# Patient Record
Sex: MALE | Race: WHITE | Employment: FULL TIME | URBAN - METROPOLITAN AREA
[De-identification: names, ages, dates, MRNs, and addresses within clinical notes are randomized per-mention and may not be internally consistent; named-entity substitution may affect disease eponyms.]

---

## 2019-10-07 NOTE — H&P
[address] [facility] [address] Gastroenterology                                                                                                  Clinic History and Physical     Pa 2-3 ETOH/wk  + cannabis   - Occupation: Business owner  - Lives with spouse  - NSAIDs/ASA use: PRN      History, Medications, Allergies, ROS:      Past Medical History:   Diagnosis Date   • Hyperlipidemia       History reviewed.  No pertinent surgical histo no abnormal bowel sounds noted, no masses are palpated  : no CVA tenderness  Skin: dry, warm, no jaundice  Ext: no cyanosis, clubbing or edema is evident.    Neuro: Alert and oriented x4, and patient is having movements of all 4 extremities   Psych: + Anx diagnosis), benefits, and alternatives to upper endoscopy/enteroscopy with the patient [who demonstrated understanding], including but not limited to the risks of bleeding, infection, pain, as well as the risks of anesthesia and perforation all leading to

## 2019-10-14 ENCOUNTER — OFFICE VISIT (OUTPATIENT)
Dept: GASTROENTEROLOGY | Facility: CLINIC | Age: 49
End: 2019-10-14
Payer: COMMERCIAL

## 2019-10-14 ENCOUNTER — TELEPHONE (OUTPATIENT)
Dept: GASTROENTEROLOGY | Facility: CLINIC | Age: 49
End: 2019-10-14

## 2019-10-14 VITALS
DIASTOLIC BLOOD PRESSURE: 78 MMHG | HEIGHT: 67 IN | RESPIRATION RATE: 18 BRPM | SYSTOLIC BLOOD PRESSURE: 126 MMHG | BODY MASS INDEX: 32.4 KG/M2 | HEART RATE: 67 BPM | OXYGEN SATURATION: 97 % | WEIGHT: 206.44 LBS

## 2019-10-14 DIAGNOSIS — K21.9 GASTROESOPHAGEAL REFLUX DISEASE, ESOPHAGITIS PRESENCE NOT SPECIFIED: Primary | ICD-10-CM

## 2019-10-14 DIAGNOSIS — R07.89 NON-CARDIAC CHEST PAIN: ICD-10-CM

## 2019-10-14 DIAGNOSIS — R13.10 DYSPHAGIA, UNSPECIFIED TYPE: ICD-10-CM

## 2019-10-14 PROCEDURE — 99243 OFF/OP CNSLTJ NEW/EST LOW 30: CPT | Performed by: NURSE PRACTITIONER

## 2019-10-14 RX ORDER — ESOMEPRAZOLE MAGNESIUM 40 MG/1
40 CAPSULE, DELAYED RELEASE ORAL
COMMUNITY
Start: 2019-09-23 | End: 2021-01-05

## 2019-10-14 NOTE — PATIENT INSTRUCTIONS
Recommend:  -Schedule EGD w/ possible biopsy and possible dilatation w/earlier available MD w/ MAC  Diagnosis: Noncardiac chest pain, GERD, dysphagia  -Eligible for NE: No r/t cannabis use   -Anti-platelets and anti-coagulants: None  -Diabetes meds: None

## 2019-10-14 NOTE — TELEPHONE ENCOUNTER
Contacted [facility] and spoke with Nisreen Cortes. CPT, ICD codes provided, determined that case needs clinical information. Instructed to fax clinical information to fax #: [phone] w/ pending reference #[id number].   Office notes faxed, fax confirmation rece

## 2019-10-14 NOTE — TELEPHONE ENCOUNTER
Scheduled for:  EGD w/poss Dil and biopsy 14841  Provider Name: Dr. Shi Greernackellie  Date:  10/24/19  Location:  [facility]  Sedation:  MAC  Time:   6495 (pt is aware to arrive at [address] )   Prep:  NPO after midnight  Meds/Allergies Reconciled?:  Physician reviewed   Musa Fontenot

## 2019-10-14 NOTE — TELEPHONE ENCOUNTER
GI/RN--    This patient is scheduled, see below    I sent a referral to [facility], [facility]. I did confirm with Ryan Mullen that [facility] does need a PA.  Thank you    You may close this TE w

## 2019-10-16 NOTE — TELEPHONE ENCOUNTER
Contacted [facility] and was transferred to [address], spoke with Denise Ordoñez. He confirmed that clinical information received and been attached to patient's case and still currently pending for review.   Per [facility] he will put a notation that

## 2019-10-21 NOTE — TELEPHONE ENCOUNTER
[address] again to obtain status. Spoke to Marcy and stated it's still pending but there's already a nurse assign to the case for review (Mickael Schwab.). I asked what the turn around time is but unable to give specific time. Will follow up tomorrow.

## 2019-10-22 ENCOUNTER — TELEPHONE (OUTPATIENT)
Dept: GASTROENTEROLOGY | Facility: CLINIC | Age: 49
End: 2019-10-22

## 2019-10-22 NOTE — TELEPHONE ENCOUNTER
Dr. Aissatou White,     Contacted patient, got verbal auth from patient to speak with wife. Pt scheduled for an EGD 10/24/19 Thursday for dysphagia.   Wife states pt felt sick Sunday night and now developed sore throat with odynophagia, non-productive cough (pt smo

## 2019-10-22 NOTE — TELEPHONE ENCOUNTER
Contacted [employer] to get status. Per Ivonne Landon from [address] [place] [address] is approved with start date of 10/24/19-11/22/19 auth number stays the same as the reference number [id number].       I asked Ivonne Landon to have the auth faxed to us but states only Clinical Intake Dept te

## 2019-10-22 NOTE — TELEPHONE ENCOUNTER
Wife contacted and relayed below message from Dr. Diomedes Singh. Wife instructed to contact us today after visit with PCP. Wife voiced understanding.

## 2019-10-22 NOTE — TELEPHONE ENCOUNTER
Pt has EGD scheduled for 10/24/19 and per wife pt is sick. Wife states pt has sore throat and coughing. Wife inquiring if pt can proceed with EGD. Wife states pt will see PCP today regarding symptoms.  Please call [phone]

## 2019-10-23 NOTE — TELEPHONE ENCOUNTER
Dr. Aissatou White,     Wife did not call yesterday so I contacted them today. Per wife PCP diagnosed pt with bronchitis, CXR negative for pneumonia, was put on zpack and was told ok to proceed with EGD.   Per wife pt is back to work today and feeling \"fine\" not

## 2019-10-23 NOTE — TELEPHONE ENCOUNTER
Hard copy of approval from [facility] received. Hard copy sent for scanning.

## 2019-10-23 NOTE — TELEPHONE ENCOUNTER
I spoke with Hardy Esqueda. @ [facility]. ELANA w/dil approved for [facility] outpatient surgery. DOS 10/24/19 through 11/22/19. Authorization # [id number]. Referral is already updared per PATIENCE HERNANDZE.

## 2019-10-24 ENCOUNTER — ANESTHESIA (OUTPATIENT)
Dept: ENDOSCOPY | Facility: HOSPITAL | Age: 49
End: 2019-10-24
Payer: COMMERCIAL

## 2019-10-24 ENCOUNTER — HOSPITAL ENCOUNTER (OUTPATIENT)
Facility: HOSPITAL | Age: 49
Setting detail: HOSPITAL OUTPATIENT SURGERY
Discharge: HOME | End: 2019-10-24
Attending: INTERNAL MEDICINE | Admitting: INTERNAL MEDICINE
Payer: COMMERCIAL

## 2019-10-24 ENCOUNTER — ANESTHESIA EVENT (OUTPATIENT)
Dept: ENDOSCOPY | Facility: HOSPITAL | Age: 49
End: 2019-10-24
Payer: COMMERCIAL

## 2019-10-24 DIAGNOSIS — R13.10 DYSPHAGIA, UNSPECIFIED TYPE: ICD-10-CM

## 2019-10-24 DIAGNOSIS — R07.89 NON-CARDIAC CHEST PAIN: ICD-10-CM

## 2019-10-24 DIAGNOSIS — K21.9 GASTROESOPHAGEAL REFLUX DISEASE, ESOPHAGITIS PRESENCE NOT SPECIFIED: ICD-10-CM

## 2019-10-24 PROCEDURE — 43239 EGD BIOPSY SINGLE/MULTIPLE: CPT | Performed by: INTERNAL MEDICINE

## 2019-10-24 PROCEDURE — 0DB38ZX EXCISION OF LOWER ESOPHAGUS, VIA NATURAL OR ARTIFICIAL OPENING ENDOSCOPIC, DIAGNOSTIC: ICD-10-PCS | Performed by: INTERNAL MEDICINE

## 2019-10-24 PROCEDURE — 0DB28ZX EXCISION OF MIDDLE ESOPHAGUS, VIA NATURAL OR ARTIFICIAL OPENING ENDOSCOPIC, DIAGNOSTIC: ICD-10-PCS | Performed by: INTERNAL MEDICINE

## 2019-10-24 RX ORDER — SODIUM CHLORIDE, SODIUM LACTATE, POTASSIUM CHLORIDE, CALCIUM CHLORIDE 600; 310; 30; 20 MG/100ML; MG/100ML; MG/100ML; MG/100ML
INJECTION, SOLUTION INTRAVENOUS CONTINUOUS
Status: DISCONTINUED | OUTPATIENT
Start: 2019-10-24 | End: 2019-10-24

## 2019-10-24 RX ADMIN — SODIUM CHLORIDE, SODIUM LACTATE, POTASSIUM CHLORIDE, CALCIUM CHLORIDE: 600; 310; 30; 20 INJECTION, SOLUTION INTRAVENOUS at 09:51:00

## 2019-10-24 NOTE — ANESTHESIA PREPROCEDURE EVALUATION
Anesthesia PreOp Note    HPI:     Heather Price is a 44year old male who presents for preoperative consultation requested by: Keagan Barron MD    Date of Surgery: 10/24/2019    Procedure(s):  ESOPHAGOGASTRODUODENOSCOPY (EGD)  Indication: Marlene Mi Types: Cannabis        Comment: OCC      Sexual activity: Not on file    Lifestyle      Physical activity:        Days per week: Not on file        Minutes per session: Not on file      Stress: Not on file    Relationships      Social connections: patient's questions were answered to the best of my ability. The patient desires the anesthetic management as planned.   Willy Story  10/24/2019 9:19 AM

## 2019-10-24 NOTE — ANESTHESIA POSTPROCEDURE EVALUATION
Patient: Maury Yañez    Procedure Summary     Date:  10/24/19 Room / Location:  [address] [place] [address] ENDOSCOPY 04 / [address] [place] [address] ENDOSCOPY    Anesthesia Start:  7605 Anesthesia Stop:  1010    Procedure:  ESOPHAGOGASTRODUODENOSCOPY (EGD) (N/A ) Diagnosis:       Gastroesophageal re

## 2019-10-24 NOTE — OPERATIVE REPORT
[facility] Endoscopy Report      Preoperative Diagnosis:  - GERD   - Atypical chest pain      Postoperative Diagnosis:  - hiatal hernia 2 cm  - reflux esophagitis      Procedure:    Esophagogastroduodenoscopy       Surgeon:  Evelyn Tapia

## 2019-10-24 NOTE — H&P
History & Physical Examination    Patient Name: Shahnaz Benjamin  MRN: [id number]  [facility]: [id number]  YOB: 1970    Diagnosis:   Gastroesophageal reflux disease, esophagitis presence not specified, Non-cardiac chest pain, Dysphagia, unspecified typ

## 2019-10-25 VITALS
DIASTOLIC BLOOD PRESSURE: 92 MMHG | BODY MASS INDEX: 29.35 KG/M2 | RESPIRATION RATE: 12 BRPM | OXYGEN SATURATION: 100 % | SYSTOLIC BLOOD PRESSURE: 128 MMHG | HEART RATE: 57 BPM | WEIGHT: 205 LBS | HEIGHT: 70 IN

## 2019-11-15 ENCOUNTER — TELEPHONE (OUTPATIENT)
Dept: GASTROENTEROLOGY | Facility: CLINIC | Age: 49
End: 2019-11-15

## 2019-11-15 DIAGNOSIS — R13.10 DYSPHAGIA, UNSPECIFIED TYPE: Primary | ICD-10-CM

## 2019-11-15 NOTE — TELEPHONE ENCOUNTER
----- Message from Alan León MD sent at 11/14/2019  6:26 PM CST -----  EGD showed a small hiatal hernia but no stricture/narrowing. RN to see how the pt is doing on the PPI therapy.    Would advise esophageal motility if ongoing issues despite acid

## 2019-11-15 NOTE — TELEPHONE ENCOUNTER
Spoke with pt and informed test results. States he has cut out coffee and alcohol and has not had any heartburn since, he feels the Nexium is helping. He also is open to having the esophageal motility test done if Dr Celia Sterling felt necessary.

## 2019-11-15 NOTE — TELEPHONE ENCOUNTER
Lets see how he does over the next 4 - 6 weeks. If ongoing issues then we can discuss esophageal motility study.

## 2019-11-18 NOTE — TELEPHONE ENCOUNTER
Dr. Ivonne Montesinos,     Pt is interested in getting the esophageal motility study rather than waiting 4-6 weeks since he's still having discomfort when he swallows, states \"has weird feeling, it's not normal, something is going on\".     States he's not having karlo

## 2019-11-19 NOTE — TELEPHONE ENCOUNTER
Pt was contacted and notified him about Dr. Prachi Villa agreeing to proceed with esophageal motility test. Was advised that he will get a call from GI schedulers to set this up. GI schedulers - please contact patient to schedule esophageal motility.  DX: dysph

## 2019-11-20 NOTE — TELEPHONE ENCOUNTER
Scheduled for:  Esophageal Manometry 17648  Provider Name: Dr. Denton Pathak  Date:  12/5/19  Location:  [facility]  Sedation:  None  Time:   0730 (pt is aware to arrive at 0630)   Prep:  No smoking, eating or drinking after 1845 the evening prior to the procedure  Meds/

## 2019-12-03 ENCOUNTER — TELEPHONE (OUTPATIENT)
Dept: GASTROENTEROLOGY | Facility: CLINIC | Age: 49
End: 2019-12-03

## 2019-12-03 NOTE — TELEPHONE ENCOUNTER
Pt's wife contacted and notified her that Dr. Miguel Hayes stated it's ok to continue with esomeprazole and still get the test done. Wife voiced understanding and denied further questions.

## 2019-12-03 NOTE — TELEPHONE ENCOUNTER
Dr. Gary Bills,     Pt is scheduled to have esophageal manometry done on 12/05/19 Thursday. Pt was instructed to hold PPI 7 days prior to the procedure but has not done so. Please advise if he can still proceed with the test.     Thank you.

## 2019-12-03 NOTE — TELEPHONE ENCOUNTER
Pts wife called with questions about pts medication and 12/5/19 procedure. She also states that she spoke to insurance and was told this will need prior auth,   Please call. [facility] phone # is [phone].

## 2019-12-03 NOTE — TELEPHONE ENCOUNTER
Referral on file states No PA is required for the procedure. Contacted [employer] and spoke with Xiomara Bill from the Intake (Prior Auth Dept) Dept Team, gave him the CPT code of 93482, [address] [facility] [place], Dr. Corbin HARGROVE and upon checking the information.   States this proce

## 2019-12-05 ENCOUNTER — HOSPITAL ENCOUNTER (OUTPATIENT)
Facility: HOSPITAL | Age: 49
Setting detail: HOSPITAL OUTPATIENT SURGERY
Discharge: HOME | End: 2019-12-05
Attending: INTERNAL MEDICINE | Admitting: INTERNAL MEDICINE
Payer: COMMERCIAL

## 2019-12-05 VITALS
OXYGEN SATURATION: 96 % | DIASTOLIC BLOOD PRESSURE: 80 MMHG | WEIGHT: 202 LBS | RESPIRATION RATE: 14 BRPM | HEART RATE: 57 BPM | SYSTOLIC BLOOD PRESSURE: 120 MMHG | HEIGHT: 67 IN | BODY MASS INDEX: 31.71 KG/M2

## 2019-12-05 DIAGNOSIS — R13.10 DYSPHAGIA, UNSPECIFIED TYPE: ICD-10-CM

## 2019-12-05 PROCEDURE — 4A0B78Z MEASUREMENT OF GASTROINTESTINAL MOTILITY, VIA NATURAL OR ARTIFICIAL OPENING: ICD-10-PCS | Performed by: INTERNAL MEDICINE

## 2019-12-05 RX ORDER — LIDOCAINE HYDROCHLORIDE 20 MG/ML
SOLUTION OROPHARYNGEAL
Status: DISCONTINUED | OUTPATIENT
Start: 2019-12-05 | End: 2019-12-05

## 2019-12-05 NOTE — OR NURSING
Esophageal manometry catheter placed at 50.5 cm to left nare numbed with lidocaine  Series of swallows tolerated well. Reviewed post op care.  No distress

## 2019-12-09 NOTE — H&P
History & Physical Examination    Patient Name: Zan Joseph  MRN: [id number]  CSN: [id number]  YOB: 1970    Diagnosis: gerd      Esomeprazole Magnesium 40 MG Oral Capsule Delayed Release, Take 40 mg by mouth every morning before breakfast.,

## 2019-12-17 ENCOUNTER — OFFICE VISIT (OUTPATIENT)
Dept: GASTROENTEROLOGY | Facility: CLINIC | Age: 49
End: 2019-12-17
Payer: COMMERCIAL

## 2019-12-17 ENCOUNTER — TELEPHONE (OUTPATIENT)
Dept: GASTROENTEROLOGY | Facility: CLINIC | Age: 49
End: 2019-12-17

## 2019-12-17 VITALS
DIASTOLIC BLOOD PRESSURE: 83 MMHG | HEIGHT: 67 IN | SYSTOLIC BLOOD PRESSURE: 128 MMHG | WEIGHT: 209 LBS | BODY MASS INDEX: 32.8 KG/M2

## 2019-12-17 DIAGNOSIS — R07.9 CHEST PAIN, UNSPECIFIED TYPE: Primary | ICD-10-CM

## 2019-12-17 PROCEDURE — 99213 OFFICE O/P EST LOW 20 MIN: CPT | Performed by: INTERNAL MEDICINE

## 2019-12-17 RX ORDER — AZITHROMYCIN 250 MG/1
TABLET, FILM COATED ORAL
Refills: 0 | COMMUNITY
Start: 2019-10-22 | End: 2021-01-05

## 2019-12-17 NOTE — TELEPHONE ENCOUNTER
Signed TEE form faxed to pt's PCP, Dr. Gaurang Garcia, for recent labwork. Also for last colonoscopy report, if his office has it. If not, will sent to [facility].     Dr. Gaurang Garcia fax: [phone]

## 2019-12-17 NOTE — PATIENT INSTRUCTIONS
Esophageal/GERD symptoms  - CT chest and abdomen  - avoid alcohol and coffee  - lab work from primary doctor  - stop smoking

## 2019-12-17 NOTE — OPERATIVE REPORT
[facility] High-resolution Esophageal Motility report      Preoperative Diagnosis:  - GERD  - dysphagia      Postoperative Diagnosis:  - normal study      Procedure:    High-resolution esophageal motility study      Surgeon:  Lisbeth Morrow,

## 2019-12-17 NOTE — PROGRESS NOTES
Nay Leslie is a 44year old male. HPI:   Patient presents with:  Hospital F/U    The patient is a 70-year-old male who has a history of atypical chest pain.   He reports this typically occurs with reflux type symptoms but is been unresponsive to acid UNKNOWN      ROS:   The patient denies any chest pain or shortness of breath,  No neurologic or dermatologic symptoms. PHYSICAL EXAM:   Blood pressure 128/83, height 5' 7\" (1.702 m), weight 209 lb (94.8 kg).     The patient appears their stated age a

## 2019-12-27 ENCOUNTER — HOSPITAL ENCOUNTER (OUTPATIENT)
Dept: CT IMAGING | Facility: HOSPITAL | Age: 49
Discharge: HOME | End: 2019-12-27
Attending: INTERNAL MEDICINE
Payer: COMMERCIAL

## 2019-12-27 ENCOUNTER — PATIENT MESSAGE (OUTPATIENT)
Dept: GASTROENTEROLOGY | Facility: CLINIC | Age: 49
End: 2019-12-27

## 2019-12-27 DIAGNOSIS — R07.9 CHEST PAIN, UNSPECIFIED TYPE: ICD-10-CM

## 2019-12-27 PROCEDURE — 71260 CT THORAX DX C+: CPT | Performed by: INTERNAL MEDICINE

## 2019-12-27 PROCEDURE — 82565 ASSAY OF CREATININE: CPT

## 2019-12-27 PROCEDURE — 74160 CT ABDOMEN W/CONTRAST: CPT | Performed by: INTERNAL MEDICINE

## 2019-12-27 NOTE — TELEPHONE ENCOUNTER
Dr. Marlen Villar,     Please see below message from the patient. Pt was contacted and confirmed that he has not had any chest pain or symptoms r/t to his problems with swallowing so far. He's also not taking PPI.   He has not made an appointment with Dr. Martha Oconnell

## 2019-12-27 NOTE — TELEPHONE ENCOUNTER
From: Rachel Pope  To: Chuckie Green. Francy Hussein MD  Sent: 12/27/2019 2:28 PM CST  Subject: Other    Thanks for the news so soon. Please let me know when we can talk or visit if necessary.   No episodes thus far as I am very careful choosing the consumption of m

## 2019-12-28 NOTE — TELEPHONE ENCOUNTER
msg left on vm, I was able to review his motility study with an esophageal expert who did not come up with any other findings. CT scan negative.     Would have the patient follow back up in the office but I would have him follow back up with his primary ph

## 2020-01-02 NOTE — TELEPHONE ENCOUNTER
Dr Diomedes Singh, I reviewed your message below. He will f/u with Dr Gaurang Garcia. States he thinks you wanted labs ordered prior to his f/u with you--he accepted appt with you Tues Feb 20, arrival 3:45pm and given directions to [facility]. Please advise on labs. Thanks.

## 2020-02-22 ENCOUNTER — PATIENT MESSAGE (OUTPATIENT)
Dept: GASTROENTEROLOGY | Facility: CLINIC | Age: 50
End: 2020-02-22

## 2020-02-25 NOTE — TELEPHONE ENCOUNTER
From: Ashlie Raya  To: Chet Baugh. Alok Pedraza MD  Sent: 2/22/2020 6:34 AM CST  Subject: Other    Hello Doctor Alok Pedraza,  I hope you are well. My sincere apologies as I missed my appointment. You may have heard I visited the [address] [facility] in error.    I would

## 2020-02-27 ENCOUNTER — TELEPHONE (OUTPATIENT)
Dept: GASTROENTEROLOGY | Facility: CLINIC | Age: 50
End: 2020-02-27

## 2020-02-27 NOTE — TELEPHONE ENCOUNTER
TE created for my chart message below :         Jalen Powers MD   GASTROENTEROLOGY    Conversation: Other   (Newest Message First)   February 26, 2020          6:32 PM   Jalen Powers MD routed this conversation to [facility] Gi Clinical Staff   Cristian Fatima My PCP has placed me on medication for an elevated thyroid number. I do hope you have the results to review. I do want to say that I have not experienced A painful esophagus   Experience since my last known episode.   As I explained to you in the past I fe

## 2020-02-27 NOTE — TELEPHONE ENCOUNTER
RN to contact Dr. Adria Oakes office, please get recent blood work faxed over. Also make sure that Dr. Adria Oakes office has done cardiac evaluation to rule out cardiac cause of prior chest symptoms.

## 2020-02-27 NOTE — TELEPHONE ENCOUNTER
Spoke to Dr. Delia banerjee they will relay message about possible stress test. And she stated they have faxed over blood work results. Will await fax and return phone call about stress test/further cardiac workup.

## 2020-02-27 NOTE — TELEPHONE ENCOUNTER
The patient was contacted, he reports he is had no further chest pains. GI work-up showed small hiatal hernia and some reflux. I discussed with the patient that he should make sure that he does not have a cardiac etiology.   He reports he has had EKGs in

## 2020-02-28 NOTE — TELEPHONE ENCOUNTER
Still waiting for faxed labs. Tried office again and close.  Will reattempt when they re-open on Monday 9AM.

## 2020-02-28 NOTE — TELEPHONE ENCOUNTER
Spoke to Cite Jose Elias Woo at Dr. Soledad Celestin  again and she stated that Dr. Soledad Celestin spoke to patients wife regarding further Cardiac workup. Cite Jose Elias Woo stated at this time he does not have any f/u apt made w/ Dr. Soledad Celestin.     Awaiting fax w/ lab work from th

## 2020-03-03 NOTE — TELEPHONE ENCOUNTER
Sent fax for lab request to Dr. Maira Eaton office Attn: April Viramontes. [phone]. Fax confirmation received.

## 2020-03-11 NOTE — TELEPHONE ENCOUNTER
Left detailed message to have copy of labs sent to office from Dr. Dolores Devi. (TEE on file) given office fax and RN triage #.

## 2020-08-06 ENCOUNTER — ORDER TRANSCRIPTION (OUTPATIENT)
Dept: SLEEP CENTER | Age: 50
End: 2020-08-06

## 2020-08-06 DIAGNOSIS — G47.30 MILD SLEEP APNEA: Primary | ICD-10-CM

## 2020-08-06 DIAGNOSIS — G47.33 OBSTRUCTIVE SLEEP APNEA (ADULT) (PEDIATRIC): Primary | ICD-10-CM

## 2020-08-15 ENCOUNTER — LAB ENCOUNTER (OUTPATIENT)
Dept: LAB | Facility: HOSPITAL | Age: 50
End: 2020-08-15
Attending: FAMILY MEDICINE
Payer: COMMERCIAL

## 2020-08-15 DIAGNOSIS — G47.33 OBSTRUCTIVE SLEEP APNEA (ADULT) (PEDIATRIC): ICD-10-CM

## 2020-08-16 LAB — SARS-COV-2 RNA RESP QL NAA+PROBE: NOT DETECTED

## 2020-08-18 ENCOUNTER — OFFICE VISIT (OUTPATIENT)
Dept: SLEEP CENTER | Age: 50
End: 2020-08-18
Attending: FAMILY MEDICINE
Payer: COMMERCIAL

## 2020-08-18 DIAGNOSIS — G47.30 MILD SLEEP APNEA: ICD-10-CM

## 2020-08-18 DIAGNOSIS — Z76.89 SLEEP CONCERN: Primary | ICD-10-CM

## 2020-08-18 PROCEDURE — 95811 POLYSOM 6/>YRS CPAP 4/> PARM: CPT

## 2020-08-20 NOTE — PROCEDURES
[address]  Accredited by the [facility] of [facility] Medicine ([facility])    PATIENT'S NAME: Johnathon Expose PHYSICIAN: Maverick Mayers MD   REFERRING PHYSICIAN: Maverick Mayers MD   PATIENT ACCOUNT #: [id number] LOCATION:

## 2021-01-05 ENCOUNTER — OFFICE VISIT (OUTPATIENT)
Dept: FAMILY MEDICINE CLINIC | Facility: CLINIC | Age: 51
End: 2021-01-05
Payer: COMMERCIAL

## 2021-01-05 VITALS
DIASTOLIC BLOOD PRESSURE: 82 MMHG | BODY MASS INDEX: 32.33 KG/M2 | HEART RATE: 81 BPM | WEIGHT: 206 LBS | OXYGEN SATURATION: 98 % | SYSTOLIC BLOOD PRESSURE: 120 MMHG | HEIGHT: 67 IN

## 2021-01-05 DIAGNOSIS — Z13.1 DIABETES MELLITUS SCREENING: ICD-10-CM

## 2021-01-05 DIAGNOSIS — Z13.220 SCREENING FOR HYPERLIPIDEMIA: ICD-10-CM

## 2021-01-05 DIAGNOSIS — E03.9 HYPOTHYROIDISM, UNSPECIFIED TYPE: Primary | ICD-10-CM

## 2021-01-05 DIAGNOSIS — Z72.0 TOBACCO USE: ICD-10-CM

## 2021-01-05 DIAGNOSIS — K21.9 GASTROESOPHAGEAL REFLUX DISEASE, UNSPECIFIED WHETHER ESOPHAGITIS PRESENT: ICD-10-CM

## 2021-01-05 DIAGNOSIS — M25.50 POLYARTHRALGIA: ICD-10-CM

## 2021-01-05 PROCEDURE — 3079F DIAST BP 80-89 MM HG: CPT | Performed by: FAMILY MEDICINE

## 2021-01-05 PROCEDURE — 3074F SYST BP LT 130 MM HG: CPT | Performed by: FAMILY MEDICINE

## 2021-01-05 PROCEDURE — 99203 OFFICE O/P NEW LOW 30 MIN: CPT | Performed by: FAMILY MEDICINE

## 2021-01-05 PROCEDURE — 3008F BODY MASS INDEX DOCD: CPT | Performed by: FAMILY MEDICINE

## 2021-01-06 PROBLEM — K21.9 GASTROESOPHAGEAL REFLUX DISEASE: Status: ACTIVE | Noted: 2021-01-06

## 2021-01-06 PROBLEM — Z72.0 TOBACCO USE: Status: ACTIVE | Noted: 2021-01-06

## 2021-01-06 PROBLEM — E03.9 HYPOTHYROIDISM: Status: ACTIVE | Noted: 2020-10-29

## 2021-01-06 PROBLEM — G47.33 OSA (OBSTRUCTIVE SLEEP APNEA): Status: ACTIVE | Noted: 2020-10-29

## 2021-01-06 NOTE — PROGRESS NOTES
HPI:    Patient ID: Mikayla Linares is a 44year old male who presents as new patient to establish care and for labs. KEIKO  Was started on levothyroxine 9 months ago. Unsure of dose. Unsure of initial TSH level. No labs since starting levothyroxine.      Sa Non-medical: Not on file    Tobacco Use      Smoking status: Current Every Day Smoker        Packs/day: 1.00      Smokeless tobacco: Never Used    Substance and Sexual Activity      Alcohol use: Not Currently      Drug use: Yes        Types: Cannabis normal. Right eye exhibits no discharge. Left eye exhibits no discharge. No scleral icterus. Neck: Normal range of motion. Neck supple. No JVD present. No thyromegaly present.    Cardiovascular: Normal rate, regular rhythm, normal heart sounds and intact Meds This Visit:  Requested Prescriptions      No prescriptions requested or ordered in this encounter       Imaging & Referrals:  None       Lorna Muñoz DO  #[id number]

## 2021-04-06 ENCOUNTER — LAB ENCOUNTER (OUTPATIENT)
Dept: LAB | Age: 51
End: 2021-04-06
Attending: FAMILY MEDICINE
Payer: COMMERCIAL

## 2021-04-06 PROCEDURE — 36415 COLL VENOUS BLD VENIPUNCTURE: CPT | Performed by: FAMILY MEDICINE

## 2021-04-06 PROCEDURE — 80061 LIPID PANEL: CPT | Performed by: FAMILY MEDICINE

## 2021-04-06 PROCEDURE — 83036 HEMOGLOBIN GLYCOSYLATED A1C: CPT | Performed by: FAMILY MEDICINE

## 2021-04-06 PROCEDURE — 85652 RBC SED RATE AUTOMATED: CPT | Performed by: FAMILY MEDICINE

## 2021-04-06 PROCEDURE — 86140 C-REACTIVE PROTEIN: CPT | Performed by: FAMILY MEDICINE

## 2021-04-06 PROCEDURE — 85027 COMPLETE CBC AUTOMATED: CPT | Performed by: FAMILY MEDICINE

## 2021-04-06 PROCEDURE — 80053 COMPREHEN METABOLIC PANEL: CPT | Performed by: FAMILY MEDICINE

## 2021-04-06 PROCEDURE — 84443 ASSAY THYROID STIM HORMONE: CPT | Performed by: FAMILY MEDICINE

## 2021-04-13 ENCOUNTER — OFFICE VISIT (OUTPATIENT)
Dept: FAMILY MEDICINE CLINIC | Facility: CLINIC | Age: 51
End: 2021-04-13
Payer: COMMERCIAL

## 2021-04-13 VITALS
WEIGHT: 206 LBS | DIASTOLIC BLOOD PRESSURE: 84 MMHG | HEART RATE: 78 BPM | HEIGHT: 67 IN | BODY MASS INDEX: 32.33 KG/M2 | SYSTOLIC BLOOD PRESSURE: 128 MMHG | OXYGEN SATURATION: 98 %

## 2021-04-13 DIAGNOSIS — M25.532 BILATERAL WRIST PAIN: Primary | ICD-10-CM

## 2021-04-13 DIAGNOSIS — M25.531 BILATERAL WRIST PAIN: Primary | ICD-10-CM

## 2021-04-13 DIAGNOSIS — E78.5 DYSLIPIDEMIA: ICD-10-CM

## 2021-04-13 DIAGNOSIS — F17.200 CURRENT SMOKER: ICD-10-CM

## 2021-04-13 DIAGNOSIS — R73.03 PREDIABETES: ICD-10-CM

## 2021-04-13 DIAGNOSIS — E03.9 HYPOTHYROIDISM, UNSPECIFIED TYPE: ICD-10-CM

## 2021-04-13 PROCEDURE — 3079F DIAST BP 80-89 MM HG: CPT | Performed by: FAMILY MEDICINE

## 2021-04-13 PROCEDURE — 99406 BEHAV CHNG SMOKING 3-10 MIN: CPT | Performed by: FAMILY MEDICINE

## 2021-04-13 PROCEDURE — 3074F SYST BP LT 130 MM HG: CPT | Performed by: FAMILY MEDICINE

## 2021-04-13 PROCEDURE — 99214 OFFICE O/P EST MOD 30 MIN: CPT | Performed by: FAMILY MEDICINE

## 2021-04-13 PROCEDURE — 3008F BODY MASS INDEX DOCD: CPT | Performed by: FAMILY MEDICINE

## 2021-04-13 NOTE — PROGRESS NOTES
HPI:    Patient ID: Katherine Holbrook is a 44year old male who presents for lab results review and b/l wrist pain. HPI  B/l wrist pain:   For past 1 year. Left > Right. Right-handed.    Pain located near lateral aspect of wrist and feels into thumb & thumb   Worried About [facility] in the Last Year:       Ran Out of Food in the Last Year:   Transportation Needs:       Lack of Transportation (Medical):       Lack of Transportation (Non-Medical):   Physical Activity:       Days of Exercise per Week: Cervical back: Neck supple. Right lower leg: No edema. Left lower leg: No edema. Comments: Negative Finkelstein's b/l. Negative wrist Tinel's b/l. Negative Phalen's b/l. Negative 1st [facility] grind test b/l.     Skin:     General: Skin is warm and

## 2021-12-03 ENCOUNTER — LAB ENCOUNTER (OUTPATIENT)
Dept: LAB | Facility: REFERENCE LAB | Age: 51
End: 2021-12-03
Attending: FAMILY MEDICINE
Payer: COMMERCIAL

## 2021-12-03 DIAGNOSIS — E03.9 HYPOTHYROIDISM, UNSPECIFIED TYPE: ICD-10-CM

## 2021-12-03 PROCEDURE — 86800 THYROGLOBULIN ANTIBODY: CPT

## 2021-12-03 PROCEDURE — 84439 ASSAY OF FREE THYROXINE: CPT

## 2021-12-03 PROCEDURE — 86376 MICROSOMAL ANTIBODY EACH: CPT

## 2021-12-03 PROCEDURE — 36415 COLL VENOUS BLD VENIPUNCTURE: CPT

## 2021-12-03 PROCEDURE — 84443 ASSAY THYROID STIM HORMONE: CPT

## 2021-12-08 ENCOUNTER — OFFICE VISIT (OUTPATIENT)
Dept: FAMILY MEDICINE CLINIC | Facility: CLINIC | Age: 51
End: 2021-12-08
Payer: COMMERCIAL

## 2021-12-08 VITALS
WEIGHT: 211 LBS | HEART RATE: 74 BPM | HEIGHT: 67 IN | OXYGEN SATURATION: 98 % | SYSTOLIC BLOOD PRESSURE: 124 MMHG | DIASTOLIC BLOOD PRESSURE: 80 MMHG | BODY MASS INDEX: 33.12 KG/M2

## 2021-12-08 DIAGNOSIS — M25.531 BILATERAL WRIST PAIN: ICD-10-CM

## 2021-12-08 DIAGNOSIS — Z72.0 TOBACCO USE: ICD-10-CM

## 2021-12-08 DIAGNOSIS — M25.511 CHRONIC PAIN OF BOTH SHOULDERS: ICD-10-CM

## 2021-12-08 DIAGNOSIS — M25.512 CHRONIC PAIN OF BOTH SHOULDERS: ICD-10-CM

## 2021-12-08 DIAGNOSIS — E06.3 HYPOTHYROIDISM DUE TO HASHIMOTO'S THYROIDITIS: Primary | ICD-10-CM

## 2021-12-08 DIAGNOSIS — E03.8 HYPOTHYROIDISM DUE TO HASHIMOTO'S THYROIDITIS: Primary | ICD-10-CM

## 2021-12-08 DIAGNOSIS — E78.5 DYSLIPIDEMIA: ICD-10-CM

## 2021-12-08 DIAGNOSIS — R73.03 PREDIABETES: ICD-10-CM

## 2021-12-08 DIAGNOSIS — G89.29 CHRONIC PAIN OF BOTH SHOULDERS: ICD-10-CM

## 2021-12-08 DIAGNOSIS — M25.532 BILATERAL WRIST PAIN: ICD-10-CM

## 2021-12-08 PROCEDURE — 3074F SYST BP LT 130 MM HG: CPT | Performed by: FAMILY MEDICINE

## 2021-12-08 PROCEDURE — 3079F DIAST BP 80-89 MM HG: CPT | Performed by: FAMILY MEDICINE

## 2021-12-08 PROCEDURE — 99214 OFFICE O/P EST MOD 30 MIN: CPT | Performed by: FAMILY MEDICINE

## 2021-12-08 PROCEDURE — 3008F BODY MASS INDEX DOCD: CPT | Performed by: FAMILY MEDICINE

## 2021-12-08 NOTE — PROGRESS NOTES
HPI:    Patient ID: Gisel Ornelas is a 46year old male who presents for few concerns. HPI  Thyroid:   C/o fatigue after work and after eating. Has shoulder & wrist pains. No diffuse myalgias.    Otherwise declines constipation, cold intolerance, swell pulses. Heart sounds: Normal heart sounds. No murmur heard. No friction rub. No gallop. Pulmonary:      Effort: Pulmonary effort is normal. No respiratory distress. Breath sounds: Normal breath sounds. No wheezing, rhonchi or rales.    Musculo due to Hashimoto's thyroiditis  - TSH W REFLEX TO FREE T4  -Reviewed recent labs in detail. -TSH up and down, and most recently very mildly elevated.    -Will defer levothyroxine for now given very mild elevation & lack of significant hypothyroidism sympto

## 2022-01-05 ENCOUNTER — MED REC SCAN ONLY (OUTPATIENT)
Dept: FAMILY MEDICINE CLINIC | Facility: CLINIC | Age: 52
End: 2022-01-05

## 2022-01-19 ENCOUNTER — PATIENT MESSAGE (OUTPATIENT)
Dept: FAMILY MEDICINE CLINIC | Facility: CLINIC | Age: 52
End: 2022-01-19

## 2022-01-20 NOTE — TELEPHONE ENCOUNTER
Background, Mychart 1/20/2022 12:14 PM CST    Thanks Doctor Lin Coleman,    I will visit with you the 26th regarding the wrist and shoulder. Please let me know only if this is an issue.     Alexi Burns

## 2022-01-26 ENCOUNTER — OFFICE VISIT (OUTPATIENT)
Dept: FAMILY MEDICINE CLINIC | Facility: CLINIC | Age: 52
End: 2022-01-26
Payer: COMMERCIAL

## 2022-01-26 VITALS
SYSTOLIC BLOOD PRESSURE: 128 MMHG | HEIGHT: 67 IN | HEART RATE: 66 BPM | WEIGHT: 208 LBS | OXYGEN SATURATION: 99 % | DIASTOLIC BLOOD PRESSURE: 76 MMHG | BODY MASS INDEX: 32.65 KG/M2

## 2022-01-26 DIAGNOSIS — G89.29 CHRONIC PAIN OF BOTH SHOULDERS: Primary | ICD-10-CM

## 2022-01-26 DIAGNOSIS — M25.511 CHRONIC PAIN OF BOTH SHOULDERS: Primary | ICD-10-CM

## 2022-01-26 DIAGNOSIS — M25.511 CHRONIC RIGHT SHOULDER PAIN: ICD-10-CM

## 2022-01-26 DIAGNOSIS — M25.532 BILATERAL WRIST PAIN: ICD-10-CM

## 2022-01-26 DIAGNOSIS — M25.531 BILATERAL WRIST PAIN: ICD-10-CM

## 2022-01-26 DIAGNOSIS — M25.512 CHRONIC PAIN OF BOTH SHOULDERS: Primary | ICD-10-CM

## 2022-01-26 DIAGNOSIS — G89.29 CHRONIC RIGHT SHOULDER PAIN: ICD-10-CM

## 2022-01-26 PROCEDURE — 3074F SYST BP LT 130 MM HG: CPT | Performed by: FAMILY MEDICINE

## 2022-01-26 PROCEDURE — 20610 DRAIN/INJ JOINT/BURSA W/O US: CPT | Performed by: FAMILY MEDICINE

## 2022-01-26 PROCEDURE — 99214 OFFICE O/P EST MOD 30 MIN: CPT | Performed by: FAMILY MEDICINE

## 2022-01-26 PROCEDURE — 3008F BODY MASS INDEX DOCD: CPT | Performed by: FAMILY MEDICINE

## 2022-01-26 PROCEDURE — 3078F DIAST BP <80 MM HG: CPT | Performed by: FAMILY MEDICINE

## 2022-01-26 RX ORDER — TRIAMCINOLONE ACETONIDE 40 MG/ML
40 INJECTION, SUSPENSION INTRA-ARTICULAR; INTRAMUSCULAR ONCE
Status: COMPLETED | OUTPATIENT
Start: 2022-01-26 | End: 2022-01-26

## 2022-01-26 RX ADMIN — TRIAMCINOLONE ACETONIDE 40 MG: 40 INJECTION, SUSPENSION INTRA-ARTICULAR; INTRAMUSCULAR at 14:42:00

## 2022-01-26 NOTE — PROGRESS NOTES
HPI:    Patient ID: Dorian Bello is a 46year old male who presents for shoulder and wrist pain f/u. HPI  Had 7 PT sessions for b/l shoulder & b/l Wrist pains. Did not do anything. Sleeping with nighttime splint on left wrist and does help a bit.    P (minimal coracoid process ttp) present. No swelling, deformity, effusion, laceration or crepitus. Normal range of motion. Normal strength. Normal pulse.       Right wrist: Normal.      Left wrist: Normal.      Right hand: Normal.      Left hand: Normal. given.         ASSESSMENT/PLAN:   Chronic pain of both shoulders  (primary encounter diagnosis)  Bilateral wrist pain  Chronic right shoulder pain    1. Chronic pain of both shoulders  - PHYSIATRY - INTERNAL  -Chronic. R>L.  -No benefit with PT.  Suspect ro

## 2022-03-03 ENCOUNTER — TELEPHONE (OUTPATIENT)
Dept: ADMINISTRATIVE | Age: 52
End: 2022-03-03

## 2022-05-03 ENCOUNTER — LAB ENCOUNTER (OUTPATIENT)
Dept: LAB | Facility: REFERENCE LAB | Age: 52
End: 2022-05-03
Attending: FAMILY MEDICINE
Payer: COMMERCIAL

## 2022-05-03 DIAGNOSIS — E78.5 HYPERLIPEMIA: Primary | ICD-10-CM

## 2022-05-03 DIAGNOSIS — E78.5 DYSLIPIDEMIA: ICD-10-CM

## 2022-05-03 LAB
ALBUMIN SERPL-MCNC: 3.6 G/DL (ref 3.4–5)
ALBUMIN/GLOB SERPL: 1.2 {RATIO} (ref 1–2)
ALP LIVER SERPL-CCNC: 68 U/L
ALT SERPL-CCNC: 23 U/L
ANION GAP SERPL CALC-SCNC: 6 MMOL/L (ref 0–18)
AST SERPL-CCNC: 10 U/L (ref 15–37)
BILIRUB SERPL-MCNC: 0.3 MG/DL (ref 0.1–2)
BUN BLD-MCNC: 20 MG/DL (ref 7–18)
BUN/CREAT SERPL: 24.1 (ref 10–20)
CALCIUM BLD-MCNC: 8.8 MG/DL (ref 8.5–10.1)
CHLORIDE SERPL-SCNC: 110 MMOL/L (ref 98–112)
CHOLEST SERPL-MCNC: 210 MG/DL (ref ?–200)
CO2 SERPL-SCNC: 27 MMOL/L (ref 21–32)
CREAT BLD-MCNC: 0.83 MG/DL
DEPRECATED RDW RBC AUTO: 45.4 FL (ref 35.1–46.3)
ERYTHROCYTE [DISTWIDTH] IN BLOOD BY AUTOMATED COUNT: 13.1 % (ref 11–15)
EST. AVERAGE GLUCOSE BLD GHB EST-MCNC: 120 MG/DL (ref 68–126)
FASTING PATIENT LIPID ANSWER: YES
FASTING STATUS PATIENT QL REPORTED: YES
GLOBULIN PLAS-MCNC: 3 G/DL (ref 2.8–4.4)
GLUCOSE BLD-MCNC: 103 MG/DL (ref 70–99)
HBA1C MFR BLD: 5.8 % (ref ?–5.7)
HCT VFR BLD AUTO: 48 %
HDLC SERPL-MCNC: 52 MG/DL (ref 40–59)
HGB BLD-MCNC: 15.7 G/DL
LDLC SERPL CALC-MCNC: 145 MG/DL (ref ?–100)
MCH RBC QN AUTO: 31 PG (ref 26–34)
MCHC RBC AUTO-ENTMCNC: 32.7 G/DL (ref 31–37)
MCV RBC AUTO: 94.7 FL
NONHDLC SERPL-MCNC: 158 MG/DL (ref ?–130)
OSMOLALITY SERPL CALC.SUM OF ELEC: 299 MOSM/KG (ref 275–295)
PLATELET # BLD AUTO: 265 10(3)UL (ref 150–450)
POTASSIUM SERPL-SCNC: 4.5 MMOL/L (ref 3.5–5.1)
PROT SERPL-MCNC: 6.6 G/DL (ref 6.4–8.2)
RBC # BLD AUTO: 5.07 X10(6)UL
SODIUM SERPL-SCNC: 143 MMOL/L (ref 136–145)
T4 FREE SERPL-MCNC: 0.8 NG/DL (ref 0.8–1.7)
TRIGL SERPL-MCNC: 73 MG/DL (ref 30–149)
TSI SER-ACNC: 4.23 MIU/ML (ref 0.36–3.74)
VLDLC SERPL CALC-MCNC: 14 MG/DL (ref 0–30)
WBC # BLD AUTO: 8.1 X10(3) UL (ref 4–11)

## 2022-05-03 PROCEDURE — 84443 ASSAY THYROID STIM HORMONE: CPT | Performed by: FAMILY MEDICINE

## 2022-05-03 PROCEDURE — 84439 ASSAY OF FREE THYROXINE: CPT | Performed by: FAMILY MEDICINE

## 2022-05-03 PROCEDURE — 80061 LIPID PANEL: CPT

## 2022-05-03 PROCEDURE — 80053 COMPREHEN METABOLIC PANEL: CPT | Performed by: FAMILY MEDICINE

## 2022-05-04 ENCOUNTER — OFFICE VISIT (OUTPATIENT)
Dept: FAMILY MEDICINE CLINIC | Facility: CLINIC | Age: 52
End: 2022-05-04
Payer: COMMERCIAL

## 2022-05-04 VITALS
BODY MASS INDEX: 32.96 KG/M2 | DIASTOLIC BLOOD PRESSURE: 70 MMHG | RESPIRATION RATE: 18 BRPM | HEART RATE: 74 BPM | HEIGHT: 67 IN | OXYGEN SATURATION: 97 % | SYSTOLIC BLOOD PRESSURE: 118 MMHG | WEIGHT: 210 LBS

## 2022-05-04 DIAGNOSIS — E06.3 HYPOTHYROIDISM DUE TO HASHIMOTO'S THYROIDITIS: ICD-10-CM

## 2022-05-04 DIAGNOSIS — E78.5 DYSLIPIDEMIA: ICD-10-CM

## 2022-05-04 DIAGNOSIS — Z00.00 PHYSICAL EXAM, ANNUAL: Primary | ICD-10-CM

## 2022-05-04 DIAGNOSIS — R73.03 PREDIABETES: ICD-10-CM

## 2022-05-04 DIAGNOSIS — M54.12 CERVICAL RADICULOPATHY: ICD-10-CM

## 2022-05-04 DIAGNOSIS — Z72.0 TOBACCO USE: ICD-10-CM

## 2022-05-04 DIAGNOSIS — E03.8 HYPOTHYROIDISM DUE TO HASHIMOTO'S THYROIDITIS: ICD-10-CM

## 2022-05-04 PROCEDURE — 99396 PREV VISIT EST AGE 40-64: CPT | Performed by: FAMILY MEDICINE

## 2022-05-04 PROCEDURE — 3074F SYST BP LT 130 MM HG: CPT | Performed by: FAMILY MEDICINE

## 2022-05-04 PROCEDURE — 3008F BODY MASS INDEX DOCD: CPT | Performed by: FAMILY MEDICINE

## 2022-05-04 PROCEDURE — 3078F DIAST BP <80 MM HG: CPT | Performed by: FAMILY MEDICINE

## 2022-05-04 PROCEDURE — 99214 OFFICE O/P EST MOD 30 MIN: CPT | Performed by: FAMILY MEDICINE

## 2022-05-04 RX ORDER — METHYLPREDNISOLONE 4 MG/1
TABLET ORAL
Qty: 1 EACH | Refills: 0 | Status: SHIPPED | OUTPATIENT
Start: 2022-05-04

## 2022-05-04 RX ORDER — ATORVASTATIN CALCIUM 10 MG/1
10 TABLET, FILM COATED ORAL NIGHTLY
Qty: 90 TABLET | Refills: 1 | Status: SHIPPED | OUTPATIENT
Start: 2022-05-04

## 2022-05-12 ENCOUNTER — HOSPITAL ENCOUNTER (OUTPATIENT)
Dept: CT IMAGING | Facility: HOSPITAL | Age: 52
Discharge: HOME | End: 2022-05-12
Attending: FAMILY MEDICINE
Payer: COMMERCIAL

## 2022-05-12 DIAGNOSIS — Z72.0 TOBACCO USE: ICD-10-CM

## 2022-05-12 PROCEDURE — 71271 CT THORAX LUNG CANCER SCR C-: CPT | Performed by: FAMILY MEDICINE

## 2022-08-04 ENCOUNTER — TELEPHONE (OUTPATIENT)
Dept: FAMILY MEDICINE CLINIC | Facility: CLINIC | Age: 52
End: 2022-08-04

## 2022-08-04 NOTE — TELEPHONE ENCOUNTER
Pt wife calling to about trying to schedule a heart scan.  Would like to know can MP do a referral for this

## 2022-08-04 NOTE — TELEPHONE ENCOUNTER
CT calcium score test  Spouse is getting one and she wants patient to get one    Linda Stoner O#[phone]  NAIF#[phone]    Faxed to Linda Stoner as requested

## 2022-08-10 ENCOUNTER — LAB ENCOUNTER (OUTPATIENT)
Dept: LAB | Facility: REFERENCE LAB | Age: 52
End: 2022-08-10
Attending: FAMILY MEDICINE
Payer: COMMERCIAL

## 2022-08-10 LAB
CHOLEST SERPL-MCNC: 173 MG/DL (ref ?–200)
FASTING PATIENT LIPID ANSWER: YES
HDLC SERPL-MCNC: 52 MG/DL (ref 40–59)
LDLC SERPL CALC-MCNC: 103 MG/DL (ref ?–100)
NONHDLC SERPL-MCNC: 121 MG/DL (ref ?–130)
T4 FREE SERPL-MCNC: 0.8 NG/DL (ref 0.8–1.7)
TRIGL SERPL-MCNC: 101 MG/DL (ref 30–149)
TSI SER-ACNC: 4.22 MIU/ML (ref 0.36–3.74)
VLDLC SERPL CALC-MCNC: 17 MG/DL (ref 0–30)

## 2022-08-10 PROCEDURE — 36415 COLL VENOUS BLD VENIPUNCTURE: CPT | Performed by: FAMILY MEDICINE

## 2022-08-10 PROCEDURE — 84443 ASSAY THYROID STIM HORMONE: CPT | Performed by: FAMILY MEDICINE

## 2022-08-10 PROCEDURE — 80061 LIPID PANEL: CPT | Performed by: FAMILY MEDICINE

## 2022-08-10 PROCEDURE — 84439 ASSAY OF FREE THYROXINE: CPT | Performed by: FAMILY MEDICINE

## 2022-08-15 ENCOUNTER — TELEPHONE (OUTPATIENT)
Dept: FAMILY MEDICINE CLINIC | Facility: CLINIC | Age: 52
End: 2022-08-15

## 2022-09-08 ENCOUNTER — OFFICE VISIT (OUTPATIENT)
Dept: FAMILY MEDICINE CLINIC | Facility: CLINIC | Age: 52
End: 2022-09-08
Payer: COMMERCIAL

## 2022-09-08 VITALS
BODY MASS INDEX: 32.33 KG/M2 | SYSTOLIC BLOOD PRESSURE: 128 MMHG | HEIGHT: 67 IN | HEART RATE: 64 BPM | DIASTOLIC BLOOD PRESSURE: 78 MMHG | WEIGHT: 206 LBS | OXYGEN SATURATION: 98 %

## 2022-09-08 DIAGNOSIS — E06.3 HYPOTHYROIDISM DUE TO HASHIMOTO'S THYROIDITIS: ICD-10-CM

## 2022-09-08 DIAGNOSIS — E03.8 HYPOTHYROIDISM DUE TO HASHIMOTO'S THYROIDITIS: ICD-10-CM

## 2022-09-08 DIAGNOSIS — F17.200 CURRENT SMOKER: ICD-10-CM

## 2022-09-08 DIAGNOSIS — R73.03 PREDIABETES: ICD-10-CM

## 2022-09-08 DIAGNOSIS — E78.5 DYSLIPIDEMIA: Primary | ICD-10-CM

## 2022-09-08 PROCEDURE — 3074F SYST BP LT 130 MM HG: CPT | Performed by: FAMILY MEDICINE

## 2022-09-08 PROCEDURE — 3008F BODY MASS INDEX DOCD: CPT | Performed by: FAMILY MEDICINE

## 2022-09-08 PROCEDURE — 3078F DIAST BP <80 MM HG: CPT | Performed by: FAMILY MEDICINE

## 2022-09-08 PROCEDURE — 99214 OFFICE O/P EST MOD 30 MIN: CPT | Performed by: FAMILY MEDICINE

## 2022-09-08 RX ORDER — ATORVASTATIN CALCIUM 10 MG/1
10 TABLET, FILM COATED ORAL NIGHTLY
Qty: 90 TABLET | Refills: 1 | Status: SHIPPED | OUTPATIENT
Start: 2022-09-08

## 2023-02-22 ENCOUNTER — NURSE TRIAGE (OUTPATIENT)
Facility: CLINIC | Age: 53
End: 2023-02-22

## 2023-02-22 NOTE — TELEPHONE ENCOUNTER
Please add on for this Friday in 8am slot (okay to double book), but have patient arrive b/t 7:30-7:40am if possible. Thank you.

## 2023-02-24 ENCOUNTER — LAB ENCOUNTER (OUTPATIENT)
Dept: LAB | Facility: REFERENCE LAB | Age: 53
End: 2023-02-24
Attending: FAMILY MEDICINE
Payer: COMMERCIAL

## 2023-02-24 ENCOUNTER — OFFICE VISIT (OUTPATIENT)
Facility: CLINIC | Age: 53
End: 2023-02-24
Payer: COMMERCIAL

## 2023-02-24 ENCOUNTER — HOSPITAL ENCOUNTER (OUTPATIENT)
Dept: GENERAL RADIOLOGY | Age: 53
Discharge: HOME | End: 2023-02-24
Attending: FAMILY MEDICINE
Payer: COMMERCIAL

## 2023-02-24 VITALS
DIASTOLIC BLOOD PRESSURE: 80 MMHG | OXYGEN SATURATION: 97 % | HEART RATE: 73 BPM | WEIGHT: 208 LBS | SYSTOLIC BLOOD PRESSURE: 126 MMHG | BODY MASS INDEX: 32.65 KG/M2 | HEIGHT: 67 IN

## 2023-02-24 DIAGNOSIS — G89.29 CHRONIC PAIN OF BOTH SHOULDERS: ICD-10-CM

## 2023-02-24 DIAGNOSIS — M25.511 CHRONIC PAIN OF BOTH SHOULDERS: ICD-10-CM

## 2023-02-24 DIAGNOSIS — M25.512 CHRONIC PAIN OF BOTH SHOULDERS: ICD-10-CM

## 2023-02-24 DIAGNOSIS — R73.03 PREDIABETES: ICD-10-CM

## 2023-02-24 DIAGNOSIS — E78.5 DYSLIPIDEMIA: ICD-10-CM

## 2023-02-24 DIAGNOSIS — M54.12 CERVICAL RADICULOPATHY: ICD-10-CM

## 2023-02-24 DIAGNOSIS — E06.3 HYPOTHYROIDISM DUE TO HASHIMOTO'S THYROIDITIS: ICD-10-CM

## 2023-02-24 DIAGNOSIS — E03.8 HYPOTHYROIDISM DUE TO HASHIMOTO'S THYROIDITIS: ICD-10-CM

## 2023-02-24 DIAGNOSIS — M54.12 CERVICAL RADICULOPATHY: Primary | ICD-10-CM

## 2023-02-24 LAB
ALBUMIN SERPL-MCNC: 3.5 G/DL (ref 3.4–5)
ALBUMIN/GLOB SERPL: 1.1 {RATIO} (ref 1–2)
ALP LIVER SERPL-CCNC: 72 U/L
ALT SERPL-CCNC: 25 U/L
ANION GAP SERPL CALC-SCNC: 4 MMOL/L (ref 0–18)
AST SERPL-CCNC: 15 U/L (ref 15–37)
BILIRUB SERPL-MCNC: 0.5 MG/DL (ref 0.1–2)
BUN BLD-MCNC: 16 MG/DL (ref 7–18)
BUN/CREAT SERPL: 17.2 (ref 10–20)
CALCIUM BLD-MCNC: 8.6 MG/DL (ref 8.5–10.1)
CHLORIDE SERPL-SCNC: 110 MMOL/L (ref 98–112)
CHOLEST SERPL-MCNC: 175 MG/DL (ref ?–200)
CO2 SERPL-SCNC: 26 MMOL/L (ref 21–32)
CREAT BLD-MCNC: 0.93 MG/DL
EST. AVERAGE GLUCOSE BLD GHB EST-MCNC: 117 MG/DL (ref 68–126)
FASTING PATIENT LIPID ANSWER: YES
FASTING STATUS PATIENT QL REPORTED: YES
GFR SERPLBLD BASED ON 1.73 SQ M-ARVRAT: 98 ML/MIN/1.73M2 (ref 60–?)
GLOBULIN PLAS-MCNC: 3.3 G/DL (ref 2.8–4.4)
GLUCOSE BLD-MCNC: 108 MG/DL (ref 70–99)
HBA1C MFR BLD: 5.7 % (ref ?–5.7)
HDLC SERPL-MCNC: 49 MG/DL (ref 40–59)
LDLC SERPL CALC-MCNC: 109 MG/DL (ref ?–100)
NONHDLC SERPL-MCNC: 126 MG/DL (ref ?–130)
OSMOLALITY SERPL CALC.SUM OF ELEC: 292 MOSM/KG (ref 275–295)
POTASSIUM SERPL-SCNC: 4.5 MMOL/L (ref 3.5–5.1)
PROT SERPL-MCNC: 6.8 G/DL (ref 6.4–8.2)
SODIUM SERPL-SCNC: 140 MMOL/L (ref 136–145)
T4 FREE SERPL-MCNC: 0.9 NG/DL (ref 0.8–1.7)
TRIGL SERPL-MCNC: 91 MG/DL (ref 30–149)
TSI SER-ACNC: 4.58 MIU/ML (ref 0.36–3.74)
VLDLC SERPL CALC-MCNC: 15 MG/DL (ref 0–30)

## 2023-02-24 PROCEDURE — 84439 ASSAY OF FREE THYROXINE: CPT | Performed by: FAMILY MEDICINE

## 2023-02-24 PROCEDURE — 72040 X-RAY EXAM NECK SPINE 2-3 VW: CPT | Performed by: FAMILY MEDICINE

## 2023-02-24 PROCEDURE — 3074F SYST BP LT 130 MM HG: CPT | Performed by: FAMILY MEDICINE

## 2023-02-24 PROCEDURE — 99214 OFFICE O/P EST MOD 30 MIN: CPT | Performed by: FAMILY MEDICINE

## 2023-02-24 PROCEDURE — 84443 ASSAY THYROID STIM HORMONE: CPT | Performed by: FAMILY MEDICINE

## 2023-02-24 PROCEDURE — 83036 HEMOGLOBIN GLYCOSYLATED A1C: CPT | Performed by: FAMILY MEDICINE

## 2023-02-24 PROCEDURE — 3008F BODY MASS INDEX DOCD: CPT | Performed by: FAMILY MEDICINE

## 2023-02-24 PROCEDURE — 36415 COLL VENOUS BLD VENIPUNCTURE: CPT | Performed by: FAMILY MEDICINE

## 2023-02-24 PROCEDURE — 3079F DIAST BP 80-89 MM HG: CPT | Performed by: FAMILY MEDICINE

## 2023-02-24 PROCEDURE — 80061 LIPID PANEL: CPT | Performed by: FAMILY MEDICINE

## 2023-02-24 PROCEDURE — 80053 COMPREHEN METABOLIC PANEL: CPT | Performed by: FAMILY MEDICINE

## 2023-03-09 ENCOUNTER — HOSPITAL ENCOUNTER (OUTPATIENT)
Dept: MRI IMAGING | Facility: HOSPITAL | Age: 53
Discharge: HOME | End: 2023-03-09
Attending: FAMILY MEDICINE
Payer: COMMERCIAL

## 2023-03-09 DIAGNOSIS — G89.29 CHRONIC PAIN OF BOTH SHOULDERS: ICD-10-CM

## 2023-03-09 DIAGNOSIS — M25.511 CHRONIC PAIN OF BOTH SHOULDERS: ICD-10-CM

## 2023-03-09 DIAGNOSIS — M25.512 CHRONIC PAIN OF BOTH SHOULDERS: ICD-10-CM

## 2023-03-09 DIAGNOSIS — M54.12 CERVICAL RADICULOPATHY: ICD-10-CM

## 2023-03-09 PROCEDURE — 72141 MRI NECK SPINE W/O DYE: CPT | Performed by: FAMILY MEDICINE

## 2023-03-13 ENCOUNTER — TELEPHONE (OUTPATIENT)
Dept: NEUROLOGY | Facility: CLINIC | Age: 53
End: 2023-03-13

## 2023-03-13 ENCOUNTER — OFFICE VISIT (OUTPATIENT)
Dept: PHYSICAL MEDICINE AND REHAB | Facility: CLINIC | Age: 53
End: 2023-03-13
Payer: COMMERCIAL

## 2023-03-13 VITALS — WEIGHT: 204 LBS | BODY MASS INDEX: 32.02 KG/M2 | HEIGHT: 67 IN

## 2023-03-13 DIAGNOSIS — M54.12 CERVICAL RADICULOPATHY: ICD-10-CM

## 2023-03-13 DIAGNOSIS — M47.812 ARTHROPATHY OF CERVICAL FACET JOINT: Primary | ICD-10-CM

## 2023-03-13 PROCEDURE — 99244 OFF/OP CNSLTJ NEW/EST MOD 40: CPT | Performed by: PHYSICAL MEDICINE & REHABILITATION

## 2023-03-13 PROCEDURE — 3008F BODY MASS INDEX DOCD: CPT | Performed by: PHYSICAL MEDICINE & REHABILITATION

## 2023-03-13 RX ORDER — GABAPENTIN 300 MG/1
CAPSULE ORAL
Qty: 90 CAPSULE | Refills: 0 | Status: SHIPPED | OUTPATIENT
Start: 2023-03-13

## 2023-03-13 RX ORDER — METHYLPREDNISOLONE 4 MG/1
TABLET ORAL
Qty: 1 EACH | Refills: 0 | Status: SHIPPED | OUTPATIENT
Start: 2023-03-13

## 2023-03-13 NOTE — TELEPHONE ENCOUNTER
Patient calling to inform that he just left the office and he check his [employer] account and they have not received the scrip for Gabapentin 300MG. He will like a call back explaining why the medication script is not there.

## 2023-03-13 NOTE — PATIENT INSTRUCTIONS
-Start PT and home exercises  -Medrol dose pack to be started  -Gabapentin 300mg three times daily  -Follow up in 4 weeks

## 2023-03-21 ENCOUNTER — TELEPHONE (OUTPATIENT)
Dept: NEUROLOGY | Facility: CLINIC | Age: 53
End: 2023-03-21

## 2023-03-21 DIAGNOSIS — M47.812 ARTHROPATHY OF CERVICAL FACET JOINT: Primary | ICD-10-CM

## 2023-03-21 NOTE — TELEPHONE ENCOUNTER
Mireille from [facility] calling to request referral for PT sessions, there is no referral currently for PT in the patients account. Please call to advice or fax Orders/Referral to 3/14/23.

## 2023-03-22 NOTE — TELEPHONE ENCOUNTER
Physical therapy ordered.     Buster Centeno DO, FAAPMR & CAQSM  Physical Medicine and Rehabilitation/Sports Medicine  [facility]

## 2023-04-04 ENCOUNTER — MED REC SCAN ONLY (OUTPATIENT)
Dept: PHYSICAL MEDICINE AND REHAB | Facility: CLINIC | Age: 53
End: 2023-04-04

## 2023-06-15 RX ORDER — ATORVASTATIN CALCIUM 10 MG/1
10 TABLET, FILM COATED ORAL NIGHTLY
Qty: 90 TABLET | Refills: 3 | Status: SHIPPED | OUTPATIENT
Start: 2023-06-15

## 2023-06-15 NOTE — TELEPHONE ENCOUNTER
The patient's wife called stating that her  is out of his medication. She did schedule his Physical on 7/25/23.         atorvastatin 10 MG Oral Tab

## 2023-06-15 NOTE — TELEPHONE ENCOUNTER
Refill passed per [place] [facility] [place] protocol. Requested Prescriptions   Pending Prescriptions Disp Refills    atorvastatin 10 MG Oral Tab 90 tablet 3     Sig: Take 1 tablet (10 mg total) by mouth nightly.        Cholesterol Medication Protocol Passed - 6/15/2023  1:50 PM        Passed - ALT in past 12 months        Passed - LDL in past 12 months        Passed - Last ALT < 80     Lab Results   Component Value Date    ALT 25 02/24/2023             Passed - Last LDL < 130     Lab Results   Component Value Date     (H) 02/24/2023             Passed - In person appointment or virtual visit in the past 12 mos or appointment in next 3 mos     Recent Outpatient Visits              3 months ago Arthropathy of cervical facet joint    [address], [address], [place], [place], [place]    Office Visit    3 months ago Cervical radiculopathy    [address], [facility], [place]    Office Visit    9 months ago Dyslipidemia    [address], [address], [place], [place]    Office Visit    1 year ago Physical exam, annual    [address] [facility], [place], [place]    Office Visit    1 year ago Chronic pain of both shoulders    [address] [place]    Office Visit          Future Appointments         Provider Department Appt Notes    In 1 month [place], DO [address], [address], [facility] Physical                     Recent Outpatient Visits              3 months ago Arthropathy of cervical facet joint    [address], [address], [place], [place], [place]    Office Visit    3 months ago Cervical radiculopathy    [facility], [address], [place], [place]    Office Visit    9 months ago Dyslipidemia    [address], [address], [place]     Office Visit    1 year ago Physical exam, annual    [address] [facility],  [place], [place]    Office Visit    1 year ago [facility] pain of both shoulders    [facility], [address], [facility] [place], [place]    Office Visit            Future Appointments         Provider Department Appt Notes    In 1 month Quinn [place]  [facility]

## 2023-07-19 DIAGNOSIS — Z00.00 PHYSICAL EXAM, ANNUAL: Primary | ICD-10-CM

## 2023-07-21 ENCOUNTER — LAB ENCOUNTER (OUTPATIENT)
Dept: LAB | Facility: REFERENCE LAB | Age: 53
End: 2023-07-21
Attending: FAMILY MEDICINE
Payer: COMMERCIAL

## 2023-07-21 LAB
ALBUMIN SERPL-MCNC: 3.8 G/DL (ref 3.4–5)
ALBUMIN/GLOB SERPL: 1.1 {RATIO} (ref 1–2)
ALP LIVER SERPL-CCNC: 69 U/L
ALT SERPL-CCNC: 28 U/L
ANION GAP SERPL CALC-SCNC: 6 MMOL/L (ref 0–18)
AST SERPL-CCNC: 21 U/L (ref 15–37)
BASOPHILS # BLD AUTO: 0.06 X10(3) UL (ref 0–0.2)
BASOPHILS NFR BLD AUTO: 0.6 %
BILIRUB SERPL-MCNC: 0.5 MG/DL (ref 0.1–2)
BUN BLD-MCNC: 17 MG/DL (ref 7–18)
BUN/CREAT SERPL: 17 (ref 10–20)
CALCIUM BLD-MCNC: 8.9 MG/DL (ref 8.5–10.1)
CHLORIDE SERPL-SCNC: 109 MMOL/L (ref 98–112)
CHOLEST SERPL-MCNC: 176 MG/DL (ref ?–200)
CO2 SERPL-SCNC: 25 MMOL/L (ref 21–32)
CREAT BLD-MCNC: 1 MG/DL
DEPRECATED RDW RBC AUTO: 45.2 FL (ref 35.1–46.3)
EGFRCR SERPLBLD CKD-EPI 2021: 89 ML/MIN/1.73M2 (ref 60–?)
EOSINOPHIL # BLD AUTO: 0.39 X10(3) UL (ref 0–0.7)
EOSINOPHIL NFR BLD AUTO: 3.8 %
ERYTHROCYTE [DISTWIDTH] IN BLOOD BY AUTOMATED COUNT: 13.2 % (ref 11–15)
EST. AVERAGE GLUCOSE BLD GHB EST-MCNC: 117 MG/DL (ref 68–126)
FASTING PATIENT LIPID ANSWER: YES
FASTING STATUS PATIENT QL REPORTED: YES
GLOBULIN PLAS-MCNC: 3.6 G/DL (ref 2.8–4.4)
GLUCOSE BLD-MCNC: 110 MG/DL (ref 70–99)
HBA1C MFR BLD: 5.7 % (ref ?–5.7)
HCT VFR BLD AUTO: 51.6 %
HCV AB SERPL QL IA: NONREACTIVE
HDLC SERPL-MCNC: 61 MG/DL (ref 40–59)
HGB BLD-MCNC: 17.2 G/DL
IMM GRANULOCYTES # BLD AUTO: 0.02 X10(3) UL (ref 0–1)
IMM GRANULOCYTES NFR BLD: 0.2 %
LDLC SERPL CALC-MCNC: 97 MG/DL (ref ?–100)
LYMPHOCYTES # BLD AUTO: 2.93 X10(3) UL (ref 1–4)
LYMPHOCYTES NFR BLD AUTO: 28.3 %
MCH RBC QN AUTO: 31.2 PG (ref 26–34)
MCHC RBC AUTO-ENTMCNC: 33.3 G/DL (ref 31–37)
MCV RBC AUTO: 93.5 FL
MONOCYTES # BLD AUTO: 0.82 X10(3) UL (ref 0.1–1)
MONOCYTES NFR BLD AUTO: 7.9 %
NEUTROPHILS # BLD AUTO: 6.13 X10 (3) UL (ref 1.5–7.7)
NEUTROPHILS # BLD AUTO: 6.13 X10(3) UL (ref 1.5–7.7)
NEUTROPHILS NFR BLD AUTO: 59.2 %
NONHDLC SERPL-MCNC: 115 MG/DL (ref ?–130)
OSMOLALITY SERPL CALC.SUM OF ELEC: 292 MOSM/KG (ref 275–295)
PLATELET # BLD AUTO: 264 10(3)UL (ref 150–450)
POTASSIUM SERPL-SCNC: 4.8 MMOL/L (ref 3.5–5.1)
PROT SERPL-MCNC: 7.4 G/DL (ref 6.4–8.2)
RBC # BLD AUTO: 5.52 X10(6)UL
SODIUM SERPL-SCNC: 140 MMOL/L (ref 136–145)
T4 FREE SERPL-MCNC: 1 NG/DL (ref 0.8–1.7)
TRIGL SERPL-MCNC: 100 MG/DL (ref 30–149)
TSI SER-ACNC: 5.55 MIU/ML (ref 0.36–3.74)
VLDLC SERPL CALC-MCNC: 16 MG/DL (ref 0–30)
WBC # BLD AUTO: 10.4 X10(3) UL (ref 4–11)

## 2023-07-21 PROCEDURE — 84439 ASSAY OF FREE THYROXINE: CPT | Performed by: FAMILY MEDICINE

## 2023-07-21 PROCEDURE — 86803 HEPATITIS C AB TEST: CPT | Performed by: FAMILY MEDICINE

## 2023-07-21 PROCEDURE — 83036 HEMOGLOBIN GLYCOSYLATED A1C: CPT | Performed by: FAMILY MEDICINE

## 2023-07-21 PROCEDURE — 80050 GENERAL HEALTH PANEL: CPT | Performed by: FAMILY MEDICINE

## 2023-07-21 PROCEDURE — 80061 LIPID PANEL: CPT | Performed by: FAMILY MEDICINE

## 2023-07-25 ENCOUNTER — OFFICE VISIT (OUTPATIENT)
Facility: CLINIC | Age: 53
End: 2023-07-25
Payer: COMMERCIAL

## 2023-07-25 VITALS
HEIGHT: 67 IN | DIASTOLIC BLOOD PRESSURE: 80 MMHG | HEART RATE: 67 BPM | SYSTOLIC BLOOD PRESSURE: 132 MMHG | BODY MASS INDEX: 32.65 KG/M2 | OXYGEN SATURATION: 98 % | WEIGHT: 208 LBS

## 2023-07-25 DIAGNOSIS — Z00.00 PHYSICAL EXAM, ANNUAL: Primary | ICD-10-CM

## 2023-07-25 DIAGNOSIS — E03.8 HYPOTHYROIDISM DUE TO HASHIMOTO'S THYROIDITIS: ICD-10-CM

## 2023-07-25 DIAGNOSIS — Z71.6 ENCOUNTER FOR SMOKING CESSATION COUNSELING: ICD-10-CM

## 2023-07-25 DIAGNOSIS — R73.03 PREDIABETES: ICD-10-CM

## 2023-07-25 DIAGNOSIS — E78.5 DYSLIPIDEMIA: ICD-10-CM

## 2023-07-25 DIAGNOSIS — Z12.2 SCREENING FOR LUNG CANCER: ICD-10-CM

## 2023-07-25 DIAGNOSIS — E06.3 HYPOTHYROIDISM DUE TO HASHIMOTO'S THYROIDITIS: ICD-10-CM

## 2023-07-25 DIAGNOSIS — F17.200 CURRENT SMOKER: ICD-10-CM

## 2023-07-25 PROCEDURE — 3075F SYST BP GE 130 - 139MM HG: CPT | Performed by: FAMILY MEDICINE

## 2023-07-25 PROCEDURE — 3008F BODY MASS INDEX DOCD: CPT | Performed by: FAMILY MEDICINE

## 2023-07-25 PROCEDURE — 99396 PREV VISIT EST AGE 40-64: CPT | Performed by: FAMILY MEDICINE

## 2023-07-25 PROCEDURE — 3079F DIAST BP 80-89 MM HG: CPT | Performed by: FAMILY MEDICINE

## 2023-07-25 PROCEDURE — 99214 OFFICE O/P EST MOD 30 MIN: CPT | Performed by: FAMILY MEDICINE

## 2023-07-25 RX ORDER — NICOTINE 21 MG/24HR
1 PATCH, TRANSDERMAL 24 HOURS TRANSDERMAL EVERY 24 HOURS
Qty: 30 EACH | Refills: 0 | Status: SHIPPED | OUTPATIENT
Start: 2023-07-25

## 2023-07-25 RX ORDER — ATORVASTATIN CALCIUM 20 MG/1
20 TABLET, FILM COATED ORAL NIGHTLY
Qty: 90 TABLET | Refills: 3 | Status: SHIPPED | OUTPATIENT
Start: 2023-07-25

## 2023-07-25 NOTE — PATIENT INSTRUCTIONS
-Discussed smoking cessation in detail, including first-line therapies (NRT vs pharmacologic options). -Decision made to trial NRT. Education provided. Will start with 21mg patch and 4mg lozenge. Instructed on appropriate use. -Patients may use up to one lozenge every one to two hours for the first six weeks of treatment. The maximum dose is five lozenges every six hours or 20 lozenges per day. -Gradually reduce number of lozenges used per day over the next six weeks.  -Place lozenge in the mouth and allow it to dissolve over 30 minutes. The lozenge does not need to be chewed. -Apply patch to different area each day. If skin irritation develops, may trial OTC hydrocortisone 1-2x/day for 1-2 weeks.   -Update me via [facility] in 4 weeks.

## 2023-07-31 ENCOUNTER — NURSE TRIAGE (OUTPATIENT)
Facility: CLINIC | Age: 53
End: 2023-07-31

## 2023-07-31 NOTE — TELEPHONE ENCOUNTER
Action Requested: Summary for Provider     []  Critical Lab, Recommendations Needed  [] Need Additional Advice  []   FYI    []   Need Orders  [] Need Medications Sent to Pharmacy  []  Other     SUMMARY:   The Patient wants to see only Dr. Chantel Lopez. Can we add him on? Offered other providers but refused. The patient stated he forgot to address this at his last visit on 7/25/23    The patient for 10 days has noted he has been getting \"sensitive red inflamed \" areas to his face, neck and arms. Some are purple and appears to have fluid in them. Once they appear they have not been going away. He tries not to touch them. More have been occurring. Denies any pain, open areas, fever. He has tried Hydocortisone cream when is not helping.     Reason for call: Rash  Onset: Data Unavailable      General Assessment (questions to ask the caller)  Do you consider this a medical emergency?: No  Can you access 911?: Yes  Do you have any pain?: No  Do you have a fever?: No  What is the date of your last medical exam?: 07/25/23  Additional Assessments  Are these symptoms new, recurrent, or chronic?: new (started 10 days ago)        \\\        Reason for Disposition   Patient wants to be seen    Protocols used: Rash or Redness - Widespread-A-OH

## 2023-07-31 NOTE — TELEPHONE ENCOUNTER
Future Appointments   Date Time Provider Josh Espino   8/3/2023  3:30 PM Ginna Morales DO [facility]

## 2023-08-03 ENCOUNTER — OFFICE VISIT (OUTPATIENT)
Facility: CLINIC | Age: 53
End: 2023-08-03
Payer: COMMERCIAL

## 2023-08-03 VITALS
HEART RATE: 60 BPM | OXYGEN SATURATION: 98 % | SYSTOLIC BLOOD PRESSURE: 126 MMHG | WEIGHT: 208 LBS | HEIGHT: 67 IN | BODY MASS INDEX: 32.65 KG/M2 | DIASTOLIC BLOOD PRESSURE: 80 MMHG

## 2023-08-03 DIAGNOSIS — L30.9 DERMATITIS: Primary | ICD-10-CM

## 2023-08-03 PROCEDURE — 3008F BODY MASS INDEX DOCD: CPT | Performed by: FAMILY MEDICINE

## 2023-08-03 PROCEDURE — 99213 OFFICE O/P EST LOW 20 MIN: CPT | Performed by: FAMILY MEDICINE

## 2023-08-03 PROCEDURE — 3079F DIAST BP 80-89 MM HG: CPT | Performed by: FAMILY MEDICINE

## 2023-08-03 PROCEDURE — 3074F SYST BP LT 130 MM HG: CPT | Performed by: FAMILY MEDICINE

## 2023-08-03 RX ORDER — TRIAMCINOLONE ACETONIDE 0.25 MG/G
1 OINTMENT TOPICAL 2 TIMES DAILY
Qty: 80 G | Refills: 1 | Status: SHIPPED | OUTPATIENT
Start: 2023-08-03 | End: 2023-08-17

## 2023-08-22 ENCOUNTER — PATIENT MESSAGE (OUTPATIENT)
Facility: CLINIC | Age: 53
End: 2023-08-22

## 2023-08-22 DIAGNOSIS — L30.9 DERMATITIS: Primary | ICD-10-CM

## 2023-08-23 NOTE — TELEPHONE ENCOUNTER
Please advise if you have a specific provider you would like Pt to see. Referral pended for dermatology in network. 1. Dermatitis  -Unknown etiology.   -Trial TAC ointment BID for up to 14 days (limit to 7 days on face). -To call/message if fails to resolve. Consider derm referral at that point. From: Dorian Bello  To: Syl Umaña DO  Sent: 8/22/2023  7:05 PM CDT  Subject: Rash on my neck    Hi doc!  I still have the rash and more have appeared  can you make the call to the referral dermatologist . Thanks so much message si I know what to do [facility]

## 2023-08-28 ENCOUNTER — APPOINTMENT (OUTPATIENT)
Dept: URBAN - METROPOLITAN AREA CLINIC 244 | Age: 53
Setting detail: DERMATOLOGY
End: 2023-08-31

## 2023-08-28 DIAGNOSIS — L81.4 OTHER MELANIN HYPERPIGMENTATION: ICD-10-CM

## 2023-08-28 DIAGNOSIS — L81.0 POSTINFLAMMATORY HYPERPIGMENTATION: ICD-10-CM

## 2023-08-28 DIAGNOSIS — R21 RASH AND OTHER NONSPECIFIC SKIN ERUPTION: ICD-10-CM

## 2023-08-28 DIAGNOSIS — L82.1 OTHER SEBORRHEIC KERATOSIS: ICD-10-CM

## 2023-08-28 DIAGNOSIS — D22 MELANOCYTIC NEVI: ICD-10-CM

## 2023-08-28 PROBLEM — D22.5 MELANOCYTIC NEVI OF TRUNK: Status: ACTIVE | Noted: 2023-08-28

## 2023-08-28 PROCEDURE — OTHER COUNSELING: OTHER

## 2023-08-28 PROCEDURE — 99203 OFFICE O/P NEW LOW 30 MIN: CPT | Mod: 25

## 2023-08-28 PROCEDURE — 11104 PUNCH BX SKIN SINGLE LESION: CPT

## 2023-08-28 PROCEDURE — OTHER MIPS QUALITY: OTHER

## 2023-08-28 PROCEDURE — OTHER BIOPSY BY PUNCH METHOD: OTHER

## 2023-08-28 PROCEDURE — OTHER PRESCRIPTION: OTHER

## 2023-08-28 RX ORDER — TRIAMCINOLONE ACETONIDE 1 MG/G
OINTMENT TOPICAL
Qty: 30 | Refills: 0 | Status: ERX | COMMUNITY
Start: 2023-08-28

## 2023-08-28 ASSESSMENT — LOCATION DETAILED DESCRIPTION DERM
LOCATION DETAILED: LEFT DISTAL PRETIBIAL REGION
LOCATION DETAILED: LEFT INFERIOR POSTAURICULAR SKIN
LOCATION DETAILED: PERIUMBILICAL SKIN

## 2023-08-28 ASSESSMENT — LOCATION SIMPLE DESCRIPTION DERM
LOCATION SIMPLE: LEFT PRETIBIAL REGION
LOCATION SIMPLE: ABDOMEN
LOCATION SIMPLE: SCALP

## 2023-08-28 ASSESSMENT — LOCATION ZONE DERM
LOCATION ZONE: SCALP
LOCATION ZONE: TRUNK
LOCATION ZONE: LEG

## 2023-08-28 NOTE — PROCEDURE: BIOPSY BY PUNCH METHOD
[FreeTextEntry3] : \par Saw and examined patient and agree with plan with modifications.\par \par Arabella Castellanos MD\par [facility]\par Pediatric Orthopedic Surgery  Punch Size In Mm: 4

## 2023-08-28 NOTE — TELEPHONE ENCOUNTER
Patient called, verified Name and . He states he was seen on 8/3 for a rash for which he was prescribed triamcinolone. He was also referred to Dr. Miya Larry, appointment scheduled . However, patient is very worried since spouse noticed the same rash in her neck yesterday. He is concerned that this may be contagious and wants to be seen sooner than . Spoke with Venkata Torres - Dr. Ken Alejandre office, verified patient's Name and . Relayed patient's concern above. She states she has a cancellation today. Appointment scheduled  at 2:20 pm.    Appointment confirmed with the patient. Reminded to arrive 20 minutes before appointment time to fill out paperworks and to bring photo insurance ID card. Patient verbalized understanding and had no further questions at this time.

## 2023-08-29 ENCOUNTER — HOSPITAL ENCOUNTER (OUTPATIENT)
Dept: CT IMAGING | Facility: HOSPITAL | Age: 53
Discharge: HOME | End: 2023-08-29
Attending: FAMILY MEDICINE
Payer: COMMERCIAL

## 2023-08-29 VITALS
WEIGHT: 206 LBS | HEIGHT: 67 IN | SYSTOLIC BLOOD PRESSURE: 130 MMHG | DIASTOLIC BLOOD PRESSURE: 88 MMHG | BODY MASS INDEX: 32.33 KG/M2

## 2023-08-29 DIAGNOSIS — Z12.2 SCREENING FOR LUNG CANCER: ICD-10-CM

## 2023-08-29 DIAGNOSIS — Z13.6 SCREENING FOR CARDIOVASCULAR CONDITION: ICD-10-CM

## 2023-08-29 DIAGNOSIS — F17.200 CURRENT SMOKER: ICD-10-CM

## 2023-08-29 PROCEDURE — 71271 CT THORAX LUNG CANCER SCR C-: CPT | Performed by: FAMILY MEDICINE

## 2023-08-30 ENCOUNTER — PATIENT MESSAGE (OUTPATIENT)
Facility: CLINIC | Age: 53
End: 2023-08-30

## 2023-09-07 ENCOUNTER — APPOINTMENT (OUTPATIENT)
Dept: URBAN - METROPOLITAN AREA CLINIC 244 | Age: 53
Setting detail: DERMATOLOGY
End: 2023-09-07

## 2023-09-07 DIAGNOSIS — T07XXXA INSECT BITE, NONVENOMOUS, OF OTHER, MULTIPLE, AND UNSPECIFIED SITES, WITHOUT MENTION OF INFECTION: ICD-10-CM

## 2023-09-07 PROBLEM — S00.06XA INSECT BITE (NONVENOMOUS) OF SCALP, INITIAL ENCOUNTER: Status: ACTIVE | Noted: 2023-09-07

## 2023-09-07 PROCEDURE — 99213 OFFICE O/P EST LOW 20 MIN: CPT

## 2023-09-07 PROCEDURE — OTHER SUTURE REMOVAL (NO GLOBAL PERIOD): OTHER

## 2023-09-07 PROCEDURE — OTHER COUNSELING: OTHER

## 2023-09-07 PROCEDURE — OTHER PRESCRIPTION MEDICATION MANAGEMENT: OTHER

## 2023-09-07 PROCEDURE — OTHER PATHOLOGY DISCUSSION: OTHER

## 2023-09-07 ASSESSMENT — LOCATION ZONE DERM: LOCATION ZONE: SCALP

## 2023-09-07 ASSESSMENT — LOCATION DETAILED DESCRIPTION DERM
LOCATION DETAILED: RIGHT INFERIOR POSTAURICULAR SKIN
LOCATION DETAILED: LEFT INFERIOR POSTAURICULAR SKIN

## 2023-09-07 ASSESSMENT — LOCATION SIMPLE DESCRIPTION DERM: LOCATION SIMPLE: SCALP

## 2023-09-07 NOTE — PROCEDURE: PRESCRIPTION MEDICATION MANAGEMENT
Render In Strict Bullet Format?: No
Continue Regimen: Triamcinolone 1-2more weeks max
Detail Level: Zone

## 2023-12-04 ENCOUNTER — LAB ENCOUNTER (OUTPATIENT)
Dept: LAB | Age: 53
End: 2023-12-04
Attending: ALLERGY & IMMUNOLOGY
Payer: COMMERCIAL

## 2023-12-04 ENCOUNTER — OFFICE VISIT (OUTPATIENT)
Dept: ALLERGY | Facility: CLINIC | Age: 53
End: 2023-12-04

## 2023-12-04 VITALS — DIASTOLIC BLOOD PRESSURE: 86 MMHG | OXYGEN SATURATION: 99 % | SYSTOLIC BLOOD PRESSURE: 129 MMHG | HEART RATE: 66 BPM

## 2023-12-04 DIAGNOSIS — Z88.9 DRUG ALLERGY: ICD-10-CM

## 2023-12-04 DIAGNOSIS — L30.9 DERMATITIS: Primary | ICD-10-CM

## 2023-12-04 DIAGNOSIS — Z23 FLU VACCINE NEED: ICD-10-CM

## 2023-12-04 DIAGNOSIS — L30.9 DERMATITIS: ICD-10-CM

## 2023-12-04 PROCEDURE — 3074F SYST BP LT 130 MM HG: CPT | Performed by: ALLERGY & IMMUNOLOGY

## 2023-12-04 PROCEDURE — 86225 DNA ANTIBODY NATIVE: CPT

## 2023-12-04 PROCEDURE — 86038 ANTINUCLEAR ANTIBODIES: CPT

## 2023-12-04 PROCEDURE — 36415 COLL VENOUS BLD VENIPUNCTURE: CPT

## 2023-12-04 PROCEDURE — 85652 RBC SED RATE AUTOMATED: CPT | Performed by: ALLERGY & IMMUNOLOGY

## 2023-12-04 PROCEDURE — 99204 OFFICE O/P NEW MOD 45 MIN: CPT | Performed by: ALLERGY & IMMUNOLOGY

## 2023-12-04 PROCEDURE — 3079F DIAST BP 80-89 MM HG: CPT | Performed by: ALLERGY & IMMUNOLOGY

## 2023-12-04 PROCEDURE — 86003 ALLG SPEC IGE CRUDE XTRC EA: CPT

## 2023-12-04 NOTE — PROGRESS NOTES
Tessa Muñoz is a 52year old male. HPI:     Chief Complaint   Patient presents with    Rash     Pt reports he had a rash in multiple places, went away, and it came back. Biopsy results brought in. He has photos. Pt is a 51 yo male who presents for allergy eval     Today pt reports hx of rash      Rash:   Duration: summer 2023   Symptoms:  bumpy, slight sensation  red   Location:  behind his  left ear ,  neck face   Frequency: intermittent   Rash lasted   Would come and go   Severity: moderate  Denies blisters or drainage  Status:   No current rash  Relievers: Triamcinolone  Triggers:?  Allergies  Fever joint pain joint swelling, blood in his urine  Last rash was 3 months ago     Works in body shop     Preceding fever, infection, bite, sting, antibiotics, NSAID or new medication:  denies   History of physical triggers:   denies       Pcn allergy: childlhood     Patient with prior dermatology evaluation x 2. Prior dermatology evaluation with Dr. Khadijah Palomares at [facility] dermatology on August 28, 2023 of the left inferior posterior auricular area showed a dense superficial and deep predominant lymphocytic inflammation with rare neutrophils and eosinophils. PAS was negative for fungal organisms. The differential diagnoses at that time included Jessner's lymphocytic infiltrate, robust dermal hypersensitivity reaction, gyrate erythema and connective tissue disease such as lupus    Patient followed by dermatology anesthetics in [address] in [facility] phone #[phone]    Travel to [facility] May and June             HISTORY:  Past Medical History:   Diagnosis Date    GERD (gastroesophageal reflux disease)     Hyperlipidemia     Hypothyroidism 2019    CELESTE (obstructive sleep apnea) 10/29/2020    Prediabetes     Tobacco use       History reviewed. No pertinent surgical history.    Family History   Problem Relation Age of Onset    Diabetes Mother     Other (Pulmonary Fibrosis) Mother       Social History:   Social History     Socioeconomic History    Marital status:    Tobacco Use    Smoking status: Every Day     Packs/day: 1.00     Years: 25.00     Additional pack years: 0.00     Total pack years: 25.00     Types: Cigarettes    Smokeless tobacco: Never   Vaping Use    Vaping Use: Never used   Substance and Sexual Activity    Alcohol use: Yes     Comment: OCASSIONAL    Drug use: Yes     Types: Cannabis     Comment: OCC        Medications (Active prior to today's visit):  Current Outpatient Medications   Medication Sig Dispense Refill    atorvastatin 20 MG Oral Tab Take 1 tablet (20 mg total) by mouth nightly. 90 tablet 3    nicotine 21 MG/24HR Transdermal Patch 24 Hr Place 1 patch onto the skin daily. (Patient not taking: Reported on 12/4/2023) 30 each 0    Nicotine Polacrilex (NICOTINE MINI) 4 MG Mouth/Throat Lozenge Place 1 lozenge (4 mg total) inside cheek as needed for Smoking cessation. (Patient not taking: Reported on 12/4/2023) 108 each 3       Allergies:   Allergies   Allergen Reactions    Penicillins UNKNOWN and OTHER (SEE COMMENTS)         ROS:     Allergic/Immuno:  See HPI  Cardiovascular:  Negative for irregular heartbeat/palpitations, chest pain, edema  Constitutional:  Negative night sweats,weight loss, irritability and lethargy  Endocrine:  Negative for cold intolerance, polydipsia and polyphagia  ENMT:  Negative for ear drainage, hearing loss and nasal drainage  Eyes:  Negative for eye discharge and vision loss  Gastrointestinal:  Negative for abdominal pain, diarrhea and vomiting  Genitourinary:  Negative for dysuria and hematuria  Hema/Lymph:  Negative for easy bleeding and easy bruising  Integumentary:  Negative for pruritus and rash  Musculoskeletal:  Negative for joint symptoms  Neurological:  Negative for dizziness, seizures  Psychiatric:  Negative for inappropriate interaction and psychiatric symptoms  Respiratory:  Negative for cough, dyspnea and wheezing      PHYSICAL EXAM:   Constitutional: responsive, no acute distress noted  Head/Face: NC/Atraumatic  Eyes/Vision: conjunctiva and lids are normal extraocular motion is intact   Ears/Audiometry: tympanic membranes are normal bilaterally hearing is grossly intact  Nose/Mouth/Throat: nose and throat are clear mucous membranes are moist   Neck/Thyroid: neck is supple without adenopathy  Lymphatic: no abnormal cervical, supraclavicular or axillary adenopathy is noted  Respiratory: normal to inspection lungs are clear to auscultation bilaterally normal respiratory effort   Cardiovascular: regular rate and rhythm no murmurs, gallups, or rubs  Abdomen: soft non-tender non-distended  Skin/Hair: no unusual rashes present  Extremities: no edema, cyanosis, or clubbing  Neurological:Oriented to time, place, person & situation       ASSESSMENT/PLAN:   Assessment   Encounter Diagnoses   Name Primary? Dermatitis Yes    Flu vaccine need     Drug allergy      #1 dermatitis  History of a red slightly itchy papular dermatitis that involves his face neck chest that would come and go over a few month. Responded to topical steroids including triamcinolone. No rash over the past 3 months. Reviewed prior biopsy report as documented in my note by [facility] dermatology. Continue with triamcinolone twice a day as needed should the rash return  Follow-up with dermatology in the future for potential repeat biopsy should rash return. Check RIYA and ESR to screen for autoimmune component    #2 penicillin allergy  Check serum IgE to penicillin. Will call with results. Prior reaction as a child. Reviewed 80% chance of outgrowing with her previous episode.     #3 flu vaccine recommended         Orders This Visit:  Orders Placed This Encounter   Procedures    Penicillin V    Connective Tissue Disease (RIYA) Screen    Sed Rate, Westergren (Automated)       Meds This Visit:  Requested Prescriptions      No prescriptions requested or ordered in this encounter       Imaging & Referrals:  None     12/4/2023  David Fisher MD      If medication samples were provided today, they were provided solely for patient education and training related to self administration of these medications. Teaching, instruction and sample was provided to the patient by myself. Teaching included  a review of potential adverse side effects as well as potential efficacy. Patient's questions were answered in regards to medication administration and dosing and potential side effects.  Teaching was provided via the teach back method

## 2023-12-05 LAB
DSDNA IGG SERPL IA-ACNC: <0.6 IU/ML
ENA AB SER QL IA: 0.2 UG/L
ENA AB SER QL IA: NEGATIVE
ERYTHROCYTE [SEDIMENTATION RATE] IN BLOOD: 9 MM/HR

## 2023-12-10 LAB — C002-IGE PENICILLOYL V: <0.1 KU/L

## 2023-12-11 ENCOUNTER — TELEPHONE (OUTPATIENT)
Dept: ALLERGY | Facility: CLINIC | Age: 53
End: 2023-12-11

## 2023-12-11 NOTE — TELEPHONE ENCOUNTER
----- Message from Jamaal Kaye MD sent at 12/5/2023  7:32 AM CST -----  Please contact patient with normal ESR.   No signs of internal inflammation which is good news  RIYA still pending

## 2023-12-11 NOTE — TELEPHONE ENCOUNTER
RN called pt to go over results listed below. Pt verified name and . RN assisted pt in scheduling PCN testing on 3/11. RN advised pt to avoid antihistamines for 5 days prior and to bring in penicillin tablet. Pt requested that I send everything from our conversation via [employer] message as well. [employer] message sent to pt.

## 2023-12-11 NOTE — TELEPHONE ENCOUNTER
----- Message from Leno Whalen MD sent at 12/5/2023  1:33 PM CST -----  Please contact patient with negative RIYA screening. This is good news.   No signs of autoimmune disease

## 2023-12-11 NOTE — TELEPHONE ENCOUNTER
----- Message from Yumiko Correia MD sent at 12/10/2023  5:28 PM CST -----  Please contact patient with negative serum IgE testing to penicillin  May consider oral challenge to further evaluate given negative testing.

## 2024-03-11 ENCOUNTER — NURSE ONLY (OUTPATIENT)
Dept: ALLERGY | Facility: CLINIC | Age: 54
End: 2024-03-11

## 2024-03-11 ENCOUNTER — TELEPHONE (OUTPATIENT)
Dept: ALLERGY | Facility: CLINIC | Age: 54
End: 2024-03-11

## 2024-03-11 ENCOUNTER — OFFICE VISIT (OUTPATIENT)
Dept: ALLERGY | Facility: CLINIC | Age: 54
End: 2024-03-11

## 2024-03-11 VITALS
HEART RATE: 75 BPM | DIASTOLIC BLOOD PRESSURE: 85 MMHG | TEMPERATURE: 98 F | OXYGEN SATURATION: 98 % | SYSTOLIC BLOOD PRESSURE: 134 MMHG | RESPIRATION RATE: 16 BRPM

## 2024-03-11 DIAGNOSIS — Z88.0 PENICILLIN ALLERGY: ICD-10-CM

## 2024-03-11 DIAGNOSIS — Z88.9 DRUG ALLERGY: Primary | ICD-10-CM

## 2024-03-11 RX ORDER — PENICILLIN V POTASSIUM 500 MG/1
500 TABLET ORAL ONCE
Qty: 1 TABLET | Refills: 0 | Status: SHIPPED | OUTPATIENT
Start: 2024-03-11 | End: 2024-03-11

## 2024-03-11 NOTE — PATIENT INSTRUCTIONS
Oral challenge to penicillin today was negative    Given patient's negative serum IgE testing to penicillin as well as negative oral challenge to penicillin patient is showing no signs of an IgE mediated allergy to penicillin.  Patient may be treated with penicillin the future if medically warranted  Penicillin removed from list of allergies   Patient can be posted of any issues with tolerating penicillin in the future

## 2024-03-11 NOTE — TELEPHONE ENCOUNTER
Patient is scheduled for PCN Oral Challenge today at 0930.        A Penicillin prescription has not been sent to a pharmacy.     Please advise.

## 2024-03-11 NOTE — PROGRESS NOTES
.  Kike Epperson is a 53 year old male.    HPI:     Chief Complaint   Patient presents with    Allergies     Pt presents for oral challenge to Penicillin     Patient is a 53-year-old male who presents for follow-up with a chief complaint of allergies  Patient presents for oral challenge to penicillin to further evaluate his allergic trigger  Recent serum IgE testing to penicillin was negative    Patient denies any active issues including fevers rashes or respiratory issues.        HISTORY:  Past Medical History:   Diagnosis Date    GERD (gastroesophageal reflux disease)     Hyperlipidemia     Hypothyroidism 2019    CELESTE (obstructive sleep apnea) 10/29/2020    Prediabetes     Tobacco use       History reviewed. No pertinent surgical history.   Family History   Problem Relation Age of Onset    Diabetes Mother     Other (Pulmonary Fibrosis) Mother       Social History:   Social History     Socioeconomic History    Marital status:    Tobacco Use    Smoking status: Every Day     Packs/day: 1.00     Years: 25.00     Additional pack years: 0.00     Total pack years: 25.00     Types: Cigarettes    Smokeless tobacco: Never   Vaping Use    Vaping Use: Never used   Substance and Sexual Activity    Alcohol use: Yes     Comment: OCASSIONAL    Drug use: Yes     Types: Cannabis     Comment: OCC        Medications (Active prior to today's visit):  Current Outpatient Medications   Medication Sig Dispense Refill    atorvastatin 20 MG Oral Tab Take 1 tablet (20 mg total) by mouth nightly. 90 tablet 3    penicillin v potassium 500 MG Oral Tab Take 1 tablet (500 mg total) by mouth once for 1 dose. Patient to bring to my office for oral challenge (Patient not taking: Reported on 3/11/2024) 1 tablet 0    nicotine 21 MG/24HR Transdermal Patch 24 Hr Place 1 patch onto the skin daily. (Patient not taking: Reported on 12/4/2023) 30 each 0    Nicotine Polacrilex (NICOTINE MINI) 4 MG Mouth/Throat Lozenge Place 1 lozenge (4 mg total)  inside cheek as needed for Smoking cessation. (Patient not taking: Reported on 12/4/2023) 108 each 3       Allergies:  No Active Allergies        ROS:   Allergic/Immuno:  See hpi  Cardiovascular:  Negative for irregular heartbeat/palpitations, chest pain, edema  Constitutional:  Negative night sweats,weight loss, irritability and lethargy  ENMT:  Negative for ear drainage, hearing loss and nasal drainage  Eyes:  Negative for eye discharge and vision loss  Gastrointestinal:  Negative for abdominal pain, diarrhea and vomiting  Integumentary:  Negative for pruritus and rash  Respiratory:  Negative for cough, dyspnea and wheezing    PHYSICAL EXAM:   Constitutional: responsive, no acute distress noted  Head/Face: NC/Atraumatic  Eyes/Vision: conjunctiva and lids are normal extraocular motion is intact   Ears/Audiometry: tympanic membranes are normal bilaterally hearing is grossly intact  Nose/Mouth/Throat: nose and throat are clear mucous membranes are moist   Neck/Thyroid: neck is supple without adenopathy  Lymphatic: no abnormal cervical, supraclavicular or axillary adenopathy is noted  Respiratory: normal to inspection lungs are clear to auscultation bilaterally normal respiratory effort   Cardiovascular: regular rate and rhythm no murmurs, gallups, or rubs  Abdomen: soft non-tender non-distended  Skin/Hair: no unusual rashes present   Extremities: no edema, cyanosis, or clubbing     ASSESSMENT/PLAN:   Assessment   Encounter Diagnoses   Name Primary?    Drug allergy Yes    Penicillin allergy        Reviewed potential adverse  with oral challenge to penicillin including local systemic as well as anaphylactic reactions as well as adverse drug reactions and side effects.    Patient acknowledges inherent risk and provides consents for oral challenge to penicillin    Oral challenge was both with Pen-Vee K 500 mg p.o. x 1 followed by 70 minutes of observation.    Oral challenge to penicillin today was  negative    Given patient's negative serum IgE testing to penicillin as well as negative oral challenge to penicillin patient is showing no signs of an IgE mediated allergy to penicillin.  Patient may be treated with penicillin the future if medically warranted  Penicillin removed from list of allergies   Patient can be posted of any issues with tolerating penicillin in the future         Orders This Visit:  No orders of the defined types were placed in this encounter.      Meds This Visit:  Requested Prescriptions      No prescriptions requested or ordered in this encounter       Imaging & Referrals:  None     3/11/2024  Marshall Fernández MD    If medication samples were provided today, they were provided solely for patient education and training related to self administration of these medications.  Teaching, instruction and sample was provided to the patient by myself.  Teaching included  a review of potential adverse side effects as well as potential efficacy.  Patient's questions were answered in regards to medication administration and dosing and potential side effects. Teaching was provided via the teach back method

## 2024-03-11 NOTE — TELEPHONE ENCOUNTER
Message left on patient's voicemail asking that he  PCN Tablet from [facility] in [place] on [address] prior to presenting for 0930 appointment for PCN oral challenge for today.     Patient asked on message just to bring tablet to appointment.   Do not ingest tablet until that tablet is given to him by Allergy RN.

## 2024-06-03 ENCOUNTER — LAB ENCOUNTER (OUTPATIENT)
Dept: LAB | Age: 54
End: 2024-06-03
Attending: FAMILY MEDICINE
Payer: COMMERCIAL

## 2024-06-03 DIAGNOSIS — Z00.00 PHYSICAL EXAM, ANNUAL: ICD-10-CM

## 2024-06-03 LAB — COMPLEXED PSA SERPL-MCNC: 0.45 NG/ML (ref ?–4)

## 2024-06-03 PROCEDURE — 84153 ASSAY OF PSA TOTAL: CPT | Performed by: FAMILY MEDICINE

## 2024-06-03 PROCEDURE — 80050 GENERAL HEALTH PANEL: CPT | Performed by: FAMILY MEDICINE

## 2024-06-03 PROCEDURE — 80061 LIPID PANEL: CPT | Performed by: FAMILY MEDICINE

## 2024-06-03 PROCEDURE — 83036 HEMOGLOBIN GLYCOSYLATED A1C: CPT | Performed by: FAMILY MEDICINE

## 2024-06-04 ENCOUNTER — OFFICE VISIT (OUTPATIENT)
Facility: CLINIC | Age: 54
End: 2024-06-04
Payer: COMMERCIAL

## 2024-06-04 VITALS
WEIGHT: 217 LBS | BODY MASS INDEX: 34.06 KG/M2 | DIASTOLIC BLOOD PRESSURE: 72 MMHG | HEIGHT: 67 IN | OXYGEN SATURATION: 98 % | SYSTOLIC BLOOD PRESSURE: 118 MMHG | HEART RATE: 69 BPM

## 2024-06-04 DIAGNOSIS — R73.03 PREDIABETES: ICD-10-CM

## 2024-06-04 DIAGNOSIS — Z00.00 PHYSICAL EXAM, ANNUAL: Primary | ICD-10-CM

## 2024-06-04 DIAGNOSIS — E78.5 DYSLIPIDEMIA: ICD-10-CM

## 2024-06-04 DIAGNOSIS — E06.3 HYPOTHYROIDISM DUE TO HASHIMOTO'S THYROIDITIS: ICD-10-CM

## 2024-06-04 DIAGNOSIS — F17.200 CURRENT SMOKER: ICD-10-CM

## 2024-06-04 DIAGNOSIS — Z12.2 SCREENING FOR LUNG CANCER: ICD-10-CM

## 2024-06-04 DIAGNOSIS — E03.8 HYPOTHYROIDISM DUE TO HASHIMOTO'S THYROIDITIS: ICD-10-CM

## 2024-06-04 PROCEDURE — 99396 PREV VISIT EST AGE 40-64: CPT | Performed by: FAMILY MEDICINE

## 2024-06-04 PROCEDURE — 3078F DIAST BP <80 MM HG: CPT | Performed by: FAMILY MEDICINE

## 2024-06-04 PROCEDURE — 3074F SYST BP LT 130 MM HG: CPT | Performed by: FAMILY MEDICINE

## 2024-06-04 PROCEDURE — 3008F BODY MASS INDEX DOCD: CPT | Performed by: FAMILY MEDICINE

## 2024-06-04 NOTE — PROGRESS NOTES
Kike Epperson is a 53 year old male who presents for a complete physical exam.   HPI:     HLD: Still taking atorvastatin 20mg daily.     Still smoking. Never started NRT. Goal is to start on his birthday next month.     Concerns: Above  Last colonoscopy:  09/2018 showing internal hemorrhoids. Rec'd repeat in 10 years.  Last PSA: 0.45 on 6/3/24  Last LDCT: 05/2022 and negative  Diet/exercise: No regular exercise. Diet is okay.   Hx of STI screening: No hx or concerns  Substance abuse: None  Vaccines- Tdap: 12/2019 , Shingles: Never & declines , Pneumococcal: Never & declines    REVIEW OF SYSTEMS:   GENERAL: feels well otherwise   SKIN: denies any unusual skin lesions  EYES:denies vision change  HEENT: no hearing changes, negative  LUNGS: denies shortness of breath with exertion  CARDIOVASCULAR: denies chest pain on exertion  GI: denies abdominal pain, constipation or diarrhea. No hematochezia or melena  : denies nocturia or changes in stream  NEURO: denies headaches  PSYCHE: denies depression or anxiety    Current Outpatient Medications   Medication Sig Dispense Refill    atorvastatin 20 MG Oral Tab Take 1 tablet (20 mg total) by mouth nightly. 90 tablet 3    nicotine 21 MG/24HR Transdermal Patch 24 Hr Place 1 patch onto the skin daily. (Patient not taking: Reported on 12/4/2023) 30 each 0      Past Medical History:    GERD (gastroesophageal reflux disease)    Hyperlipidemia    Hypothyroidism    CELESTE (obstructive sleep apnea)    Prediabetes    Tobacco use      History reviewed. No pertinent surgical history.   Family History   Problem Relation Age of Onset    Diabetes Mother     Other (Pulmonary Fibrosis) Mother       Social History:  Social History     Socioeconomic History    Marital status:    Tobacco Use    Smoking status: Every Day     Current packs/day: 1.00     Average packs/day: 1 pack/day for 25.0 years (25.0 ttl pk-yrs)     Types: Cigarettes    Smokeless tobacco: Never   Vaping Use    Vaping status:  Never Used   Substance and Sexual Activity    Alcohol use: Yes     Comment: OCASSIONAL    Drug use: Yes     Types: Cannabis     Comment: OCC     Social Determinants of Health      [facility]    Social Connections    [facility]    Housing Stability           EXAM:     Wt Readings from Last 6 Encounters:   06/04/24 217 lb (98.4 kg)   08/29/23 206 lb (93.4 kg)   08/03/23 208 lb (94.3 kg)   07/25/23 208 lb (94.3 kg)   03/13/23 204 lb (92.5 kg)   02/24/23 208 lb (94.3 kg)     Body mass index is 33.99 kg/m².    /72   Pulse 69   Ht 5' 7\" (1.702 m)   Wt 217 lb (98.4 kg)   SpO2 98%   BMI 33.99 kg/m²      GENERAL: well developed, well nourished, in no apparent distress  SKIN: no rashes, no suspicious lesions  HEENT: atraumatic, normocephalic, MMM, nose normal, Tms & EACs normal  EYES: PERRLA, EOMI, no conjunctival injection  NECK: supple, no adenopathy   LUNGS: CTA b/l, no w/r/r  CARDIO: RRR without murmur  GI: normoactive bowel sounds, NT/ND, no masses, no HSM  EXTREMITIES: no cyanosis, clubbing or edema  NEURO: Alert & oriented, motor and sensory are grossly intact    Cholesterol, Total (mg/dL)   Date Value   06/03/2024 151   07/21/2023 176   02/24/2023 175     HDL Cholesterol (mg/dL)   Date Value   06/03/2024 46   07/21/2023 61 (H)   02/24/2023 49     LDL Cholesterol (mg/dL)   Date Value   06/03/2024 87   07/21/2023 97   02/24/2023 109 (H)     AST (U/L)   Date Value   06/03/2024 31   07/21/2023 21   02/24/2023 15     ALT (U/L)   Date Value   06/03/2024 23   07/21/2023 28   02/24/2023 25      ASSESSMENT AND PLAN:   Kike Epperson is a 53 year old male who presents for a complete physical exam.    Encounter Diagnoses   Name Primary?    Physical exam, annual Yes    Dyslipidemia     Prediabetes     Hypothyroidism due to Hashimoto's thyroiditis     Current smoker     Screening for lung  cancer      No orders of the defined types were placed in this encounter.      -Reviewed recent labs in detail.   -HLD: Well-controlled. CPM.  -Hypothyroidism: TSH normalized. No current pharmacotherapy. Continue repeating TSH yearly.   -Smoking: Plan to start NRT next month. Due for LDCT Lung.     Discussed with patient the following:  -Risks and benefits of screening for prostate cancer:  -Colon cancer screening   -Screening for lung cancer which he desires  -Healthy diet including adequate intake of vegetables and fruits, appropriate portion sizes, minimizing highly concentrated carbohydrate foods  -Exercising 30 minutes a day most days of the week   -Importance of regular exercise and weight maintenance/loss   -Smoking cessation   -Diabetes screening   -Cholesterol screening   -Recommendation for yearly influenza vaccine  -Need for Tdap once as an adult and Td booster every 10 years  -Need for Zoster vaccine for patients >= 50     Meds & Refills for this Visit:  Requested Prescriptions      No prescriptions requested or ordered in this encounter       Imaging & Consults:  CT LUNG LD SCREENING(CPT=71271)    Return in 1 year for annual physical or sooner as needed.     Dieudonne Fonseca DO  06/04/24   9:17 AM

## 2024-06-19 ENCOUNTER — HOSPITAL ENCOUNTER (OUTPATIENT)
Dept: CT IMAGING | Facility: HOSPITAL | Age: 54
Discharge: HOME | End: 2024-06-19
Attending: FAMILY MEDICINE

## 2024-06-19 DIAGNOSIS — F17.200 CURRENT SMOKER: ICD-10-CM

## 2024-06-19 DIAGNOSIS — Z12.2 SCREENING FOR LUNG CANCER: ICD-10-CM

## 2024-06-19 PROCEDURE — 71271 CT THORAX LUNG CANCER SCR C-: CPT | Performed by: FAMILY MEDICINE

## 2024-06-25 ENCOUNTER — NURSE TRIAGE (OUTPATIENT)
Facility: CLINIC | Age: 54
End: 2024-06-25

## 2024-06-25 ENCOUNTER — OFFICE VISIT (OUTPATIENT)
Facility: CLINIC | Age: 54
End: 2024-06-25

## 2024-06-25 VITALS
BODY MASS INDEX: 33.27 KG/M2 | OXYGEN SATURATION: 99 % | SYSTOLIC BLOOD PRESSURE: 104 MMHG | DIASTOLIC BLOOD PRESSURE: 72 MMHG | WEIGHT: 212 LBS | HEIGHT: 67 IN | HEART RATE: 103 BPM

## 2024-06-25 DIAGNOSIS — I48.92 ATRIAL FLUTTER, UNSPECIFIED TYPE (HCC): Primary | ICD-10-CM

## 2024-06-25 DIAGNOSIS — R31.29 MICROSCOPIC HEMATURIA: ICD-10-CM

## 2024-06-25 DIAGNOSIS — R07.89 ATYPICAL CHEST PAIN: ICD-10-CM

## 2024-06-25 DIAGNOSIS — R00.0 TACHYCARDIA: ICD-10-CM

## 2024-06-25 DIAGNOSIS — R30.0 DYSURIA: ICD-10-CM

## 2024-06-25 LAB
APPEARANCE: CLEAR
ATRIAL RATE: 271 BPM
BILIRUB UR QL: NEGATIVE
BILIRUBIN: NEGATIVE
CLARITY UR: CLEAR
GLUCOSE (URINE DIPSTICK): NEGATIVE MG/DL
GLUCOSE UR-MCNC: NORMAL MG/DL
KETONES (URINE DIPSTICK): NEGATIVE MG/DL
KETONES UR-MCNC: NEGATIVE MG/DL
LEUKOCYTE ESTERASE UR QL STRIP.AUTO: 500
MULTISTIX EXPIRATION DATE: ABNORMAL DATE
MULTISTIX LOT#: ABNORMAL NUMERIC
NITRITE UR QL STRIP.AUTO: NEGATIVE
NITRITE, URINE: NEGATIVE
P AXIS: 268 DEGREES
PH UR: 6 [PH] (ref 5–8)
PH, URINE: 6 (ref 4.5–8)
PROT UR-MCNC: NEGATIVE MG/DL
PROTEIN (URINE DIPSTICK): NEGATIVE MG/DL
Q-T INTERVAL: 316 MS
QRS DURATION: 80 MS
QTC CALCULATION (BEZET): 413 MS
R AXIS: 40 DEGREES
SP GR UR STRIP: 1.01 (ref 1–1.03)
SPECIFIC GRAVITY: 1.01 (ref 1–1.03)
T AXIS: 23 DEGREES
URINE-COLOR: YELLOW
UROBILINOGEN UR STRIP-ACNC: NORMAL
UROBILINOGEN,SEMI-QN: 0.2 MG/DL (ref 0–1.9)
VENTRICULAR RATE: 103 BPM

## 2024-06-25 PROCEDURE — 3078F DIAST BP <80 MM HG: CPT | Performed by: FAMILY MEDICINE

## 2024-06-25 PROCEDURE — 81002 URINALYSIS NONAUTO W/O SCOPE: CPT | Performed by: FAMILY MEDICINE

## 2024-06-25 PROCEDURE — 3008F BODY MASS INDEX DOCD: CPT | Performed by: FAMILY MEDICINE

## 2024-06-25 PROCEDURE — 3074F SYST BP LT 130 MM HG: CPT | Performed by: FAMILY MEDICINE

## 2024-06-25 PROCEDURE — 87186 SC STD MICRODIL/AGAR DIL: CPT | Performed by: FAMILY MEDICINE

## 2024-06-25 PROCEDURE — 93000 ELECTROCARDIOGRAM COMPLETE: CPT | Performed by: FAMILY MEDICINE

## 2024-06-25 PROCEDURE — 81001 URINALYSIS AUTO W/SCOPE: CPT | Performed by: FAMILY MEDICINE

## 2024-06-25 PROCEDURE — 87086 URINE CULTURE/COLONY COUNT: CPT | Performed by: FAMILY MEDICINE

## 2024-06-25 PROCEDURE — 99215 OFFICE O/P EST HI 40 MIN: CPT | Performed by: FAMILY MEDICINE

## 2024-06-25 PROCEDURE — 87077 CULTURE AEROBIC IDENTIFY: CPT | Performed by: FAMILY MEDICINE

## 2024-06-25 NOTE — TELEPHONE ENCOUNTER
Action Requested: Summary for Provider     []  Critical Lab, Recommendations Needed  [x] Need Additional Advice  []   FYI    []   Need Orders  [] Need Medications Sent to Pharmacy  []  Other     SUMMARY: Per protocol, patient should be seen in the office today.     Dr. Fonseca please advise if okay to use SDA today or other recommendation.    Reason for call: Urinary Symptoms (/)  Onset: 6/25    Patient reports of urinary symptoms of frequency, urgency, and burning sensation with urination. No fever but had chills this morning. Denies blood in the urine. No flank or back pain. He would like to be seen today. Care advice given per protocol - push fluids. Patient verbalized understanding and agreed with plan of care.    Reason for Disposition   Urinating more frequently than usual (i.e., frequency)    Protocols used: Urinary Symptoms-A-OH

## 2024-06-25 NOTE — PROGRESS NOTES
HPI:    Patient ID: Kike Epperson is a 53 year old male who presents for urinary issues.    HPI  Woke up shivering this morning.   Went to work and had dysuria.   Also having urinary frequency and urgency.  Left work and napped at home. Woke up sweating.   Chills have resolved.   Urinating in small increments.   No hematuria.  No fevers.   No abdominal pain, N/V, back pain, or flank pain.   No other associated symptoms.   No prior UTI or kidney stones.    Sexually active with wife. No recent intercourse. No other partners.     Also endorses intermittent chest pain today. Comes and goes. No identifiable triggers. No associated symptoms.     Felt normal when he went to bed last night.     Past Medical History:    GERD (gastroesophageal reflux disease)    Hyperlipidemia    Hypothyroidism    CELESTE (obstructive sleep apnea)    Prediabetes    Tobacco use        Current Outpatient Medications   Medication Sig Dispense Refill    atorvastatin 20 MG Oral Tab Take 1 tablet (20 mg total) by mouth nightly. 90 tablet 3        No Active Allergies    Review of Systems   Constitutional: Negative.    Respiratory: Negative.  Negative for chest tightness and shortness of breath.    Cardiovascular:  Positive for chest pain. Negative for palpitations and leg swelling.   Gastrointestinal: Negative.    Genitourinary:  Positive for decreased urine volume, dysuria, frequency and urgency. Negative for difficulty urinating, flank pain, genital sores, hematuria, penile discharge, penile pain, penile swelling, scrotal swelling and testicular pain.   Neurological: Negative.  Negative for dizziness and light-headedness.   All other systems reviewed and are negative.           /72   Pulse 103   Ht 5' 7\" (1.702 m)   Wt 212 lb (96.2 kg)   SpO2 99%   BMI 33.20 kg/m²     PHYSICAL EXAM:   Physical Exam  Constitutional:       General: He is not in acute distress.     Appearance: Normal appearance. He is not ill-appearing, toxic-appearing or  diaphoretic.   HENT:      Head: Normocephalic and atraumatic.      Right Ear: External ear normal.      Left Ear: External ear normal.      Nose: Nose normal.      Mouth/Throat:      Mouth: Mucous membranes are moist.      Pharynx: Oropharynx is clear.   Eyes:      Extraocular Movements: Extraocular movements intact.      Conjunctiva/sclera: Conjunctivae normal.   Cardiovascular:      Rate and Rhythm: Regular rhythm. Tachycardia present.      Pulses: Normal pulses.      Heart sounds: Normal heart sounds. No murmur heard.     No friction rub. No gallop.   Pulmonary:      Effort: Pulmonary effort is normal. No respiratory distress.      Breath sounds: Normal breath sounds. No wheezing, rhonchi or rales.   Abdominal:      General: Abdomen is flat. Bowel sounds are normal. There is no distension.      Palpations: Abdomen is soft. There is no mass.      Tenderness: There is no abdominal tenderness. There is no right CVA tenderness, left CVA tenderness, guarding or rebound.      Hernia: No hernia is present.   Musculoskeletal:      Cervical back: Neck supple.      Right lower leg: No edema.      Left lower leg: No edema.   Skin:     General: Skin is warm and dry.      Capillary Refill: Capillary refill takes less than 2 seconds.   Neurological:      General: No focal deficit present.      Mental Status: He is alert.   Psychiatric:         Mood and Affect: Mood normal.         Behavior: Behavior normal.         Thought Content: Thought content normal.         Judgment: Judgment normal.             ASSESSMENT/PLAN:     Encounter Diagnoses   Name Primary?    Atrial flutter, unspecified type (HCC) Yes    Atypical chest pain     Tachycardia     Dysuria     Microscopic hematuria        1. Atrial flutter, unspecified type (HCC)  -Pt present for urinary symptoms but also endorsed chest pain.  -EKG today showing atrial flutter. No prior hx. Suspect this could be related to urinary symptoms & possible infection.   -I recommend  urgent ED evaluation. He did drive himself here today and we did discuss EMS. He feels he can drive to ED. Recommend [facility] given proximity. I reached out to [facility] ED and made them aware of his arrival.     2. Atypical chest pain  - ELECTROCARDIOGRAM, COMPLETE  -Plan as above.     3. Tachycardia  -Plan as above.     4. Dysuria  - URINALYSIS NONAUTO W/O SCOPE  - Urinalysis, Routine  - Urine Culture, Routine  -POC urine dip today w/ moderate blood & moderate LE.  -Will mention to ED staff as well as he may benefit from CT to r/o kidney stone.   -UA & Ucx sent as well.     5. Microscopic hematuria  -Plan as above.     Meds This Visit:  Requested Prescriptions      No prescriptions requested or ordered in this encounter       Imaging & Referrals:  ELECTROCARDIOGRAM, COMPLETE       Dieudonne Fonseca,   ID#[id number]

## 2024-06-28 ENCOUNTER — TELEPHONE (OUTPATIENT)
Facility: CLINIC | Age: 54
End: 2024-06-28

## 2024-06-28 NOTE — TELEPHONE ENCOUNTER
Called with no answer. LVM notifying him to schedule hospital f/u appt next week. Okay to use SDA.

## 2024-06-28 NOTE — TELEPHONE ENCOUNTER
You are receiving this encounter because we received information you received this page while on call and we did not find a note in the chart about the advice/directives you provided to the patient. Please indicate your actions in this encounter.       Name:LALO                      2024 8:02:28 AM  ProfileId:                        PagerID       [phone]  Department:[facility]  ======================================================================  Paging    Message # [phone]         2024 06:52a   [MONICAG]  To:  From:  CONSOLE  Primary MD:  Phone#:  ----------------------------------------------------------------------  JOSÉ ANTONIO ANGUIANO [phone] [facility] 66 RE PT REPORTED IS CURRENTLY AT HOSPITAL. HAD CARDIOVERSION DONE, IT SEEMED TO CORRECT IRREGULARITY.  DX TODAY  Paged at number :  PAGE: [phone] at :   06:52        (Message Delivered)   SRIRAM E L I V E R I E S :  2024 06:52a           MONICAG       Delivered

## 2024-07-01 ENCOUNTER — TELEPHONE (OUTPATIENT)
Facility: CLINIC | Age: 54
End: 2024-07-01

## 2024-07-01 NOTE — TELEPHONE ENCOUNTER
The patient's wife called for a hospital follow-up for her . She states he should be seen this week.

## 2024-07-02 NOTE — TELEPHONE ENCOUNTER
Patient wife requesting update on message, this PSR stated the message below and help patient wife schedule appointment for patient for tomorrow 7/3/2024 at 10am.

## 2024-07-03 ENCOUNTER — OFFICE VISIT (OUTPATIENT)
Facility: CLINIC | Age: 54
End: 2024-07-03
Payer: COMMERCIAL

## 2024-07-03 VITALS
DIASTOLIC BLOOD PRESSURE: 76 MMHG | HEART RATE: 66 BPM | OXYGEN SATURATION: 98 % | HEIGHT: 67 IN | BODY MASS INDEX: 33.12 KG/M2 | SYSTOLIC BLOOD PRESSURE: 122 MMHG | WEIGHT: 211 LBS

## 2024-07-03 DIAGNOSIS — F17.200 CURRENT SMOKER: ICD-10-CM

## 2024-07-03 DIAGNOSIS — I25.10 MILD CORONARY ARTERY DISEASE: ICD-10-CM

## 2024-07-03 DIAGNOSIS — Z92.89 HISTORY OF CARDIOVERSION: ICD-10-CM

## 2024-07-03 DIAGNOSIS — I48.92 ATRIAL FLUTTER WITH RAPID VENTRICULAR RESPONSE (HCC): Primary | ICD-10-CM

## 2024-07-03 PROCEDURE — 99214 OFFICE O/P EST MOD 30 MIN: CPT | Performed by: FAMILY MEDICINE

## 2024-07-03 PROCEDURE — 3078F DIAST BP <80 MM HG: CPT | Performed by: FAMILY MEDICINE

## 2024-07-03 PROCEDURE — 3074F SYST BP LT 130 MM HG: CPT | Performed by: FAMILY MEDICINE

## 2024-07-03 PROCEDURE — 3008F BODY MASS INDEX DOCD: CPT | Performed by: FAMILY MEDICINE

## 2024-07-03 RX ORDER — METOPROLOL SUCCINATE 25 MG/1
25 TABLET, EXTENDED RELEASE ORAL DAILY
COMMUNITY
Start: 2024-06-29

## 2024-07-03 NOTE — PROGRESS NOTES
HPI:    Patient ID: Kike Epperson is a 53 year old male who presents for hospital f/u.    HPI  Was hospitalized at [facility] from 6/25/24 to 6/28/24 for atrial flutter. S/p cardioversion. Was evaluated by cardiology & EP. Coronary angiogram showed mild non-obstructive CAD in mid LAD and proximal OM2. TTE poor quality but showed moderately reduced systolic function.  Discharged home and xarelto, metoprolol, and keflex (for UTI).     Completed abx course. No urinary symptoms.   Taking xarelto, metoprolol, and statin.  Feels well today.  Denies any current symptoms or issues.   Has f/u with cardiology NP in 2 weeks, and EP in 3 months.     Past Medical History:    Atrial flutter with rapid ventricular response (HCC)    s/p cardioversion    GERD (gastroesophageal reflux disease)    Hyperlipidemia    Hypothyroidism    Mild coronary artery disease    CELESTE (obstructive sleep apnea)    Prediabetes    Tobacco use        Current Outpatient Medications   Medication Sig Dispense Refill    rivaroxaban 20 MG Oral Tab Take 1 tablet (20 mg total) by mouth.      metoprolol succinate ER 25 MG Oral Tablet 24 Hr Take 1 tablet (25 mg total) by mouth daily.      atorvastatin 20 MG Oral Tab Take 1 tablet (20 mg total) by mouth nightly. 90 tablet 3        No Active Allergies    Review of Systems   Constitutional: Negative.    Respiratory: Negative.     Cardiovascular: Negative.    Gastrointestinal: Negative.    Neurological: Negative.    All other systems reviewed and are negative.           /76   Pulse 66   Ht 5' 7\" (1.702 m)   Wt 211 lb (95.7 kg)   SpO2 98%   BMI 33.05 kg/m²     PHYSICAL EXAM:   Physical Exam  Constitutional:       General: He is not in acute distress.     Appearance: Normal appearance. He is not ill-appearing, toxic-appearing or diaphoretic.   HENT:      Head: Normocephalic and atraumatic.   Eyes:      Extraocular Movements: Extraocular movements intact.      Conjunctiva/sclera: Conjunctivae normal.    Cardiovascular:      Rate and Rhythm: Normal rate and regular rhythm.      Pulses: Normal pulses.      Heart sounds: Normal heart sounds. No murmur heard.     No friction rub. No gallop.   Pulmonary:      Effort: Pulmonary effort is normal. No respiratory distress.      Breath sounds: Normal breath sounds. No wheezing, rhonchi or rales.   Musculoskeletal:      Cervical back: Neck supple.      Right lower leg: No edema.      Left lower leg: No edema.   Skin:     General: Skin is warm and dry.      Capillary Refill: Capillary refill takes less than 2 seconds.   Neurological:      General: No focal deficit present.      Mental Status: He is alert.   Psychiatric:         Mood and Affect: Mood normal.         Behavior: Behavior normal.         Thought Content: Thought content normal.         Judgment: Judgment normal.             ASSESSMENT/PLAN:     Encounter Diagnoses   Name Primary?    Atrial flutter with rapid ventricular response (HCC) Yes    History of cardioversion     Mild coronary artery disease     Current smoker        1. Atrial flutter with rapid ventricular response (HCC)    2. History of cardioversion    3. Mild coronary artery disease    4. Current smoker     -Reviewed hospital records & results.   -Doing well today w/o complaints.   -CPM with xarelto, metoprolol, and statin.   -Has cardiology & EP f/u per HPI.  -Plan to RTC in 3 months following EP visit and will discuss NRT at that time.     Meds This Visit:  Requested Prescriptions      No prescriptions requested or ordered in this encounter       Imaging & Referrals:  None    A total of 33 minutes were spent with patient and reviewing medical records pertaining to this visit.        Dieudonne Fonseca, DO  ID#[id number]

## 2024-07-08 ENCOUNTER — OFFICE VISIT (OUTPATIENT)
Facility: CLINIC | Age: 54
End: 2024-07-08
Payer: COMMERCIAL

## 2024-07-08 ENCOUNTER — TELEPHONE (OUTPATIENT)
Facility: CLINIC | Age: 54
End: 2024-07-08

## 2024-07-08 VITALS
DIASTOLIC BLOOD PRESSURE: 70 MMHG | SYSTOLIC BLOOD PRESSURE: 118 MMHG | HEART RATE: 54 BPM | HEIGHT: 67 IN | OXYGEN SATURATION: 98 % | BODY MASS INDEX: 33.12 KG/M2 | WEIGHT: 211 LBS

## 2024-07-08 DIAGNOSIS — F17.200 CURRENT SMOKER: ICD-10-CM

## 2024-07-08 DIAGNOSIS — Z92.89 HISTORY OF CARDIOVERSION: ICD-10-CM

## 2024-07-08 DIAGNOSIS — R31.0 GROSS HEMATURIA: Primary | ICD-10-CM

## 2024-07-08 DIAGNOSIS — I48.92 ATRIAL FLUTTER WITH RAPID VENTRICULAR RESPONSE (HCC): ICD-10-CM

## 2024-07-08 PROCEDURE — 3008F BODY MASS INDEX DOCD: CPT | Performed by: FAMILY MEDICINE

## 2024-07-08 PROCEDURE — 3074F SYST BP LT 130 MM HG: CPT | Performed by: FAMILY MEDICINE

## 2024-07-08 PROCEDURE — 99214 OFFICE O/P EST MOD 30 MIN: CPT | Performed by: FAMILY MEDICINE

## 2024-07-08 PROCEDURE — 3078F DIAST BP <80 MM HG: CPT | Performed by: FAMILY MEDICINE

## 2024-07-08 RX ORDER — CIPROFLOXACIN 500 MG/1
500 TABLET, FILM COATED ORAL 2 TIMES DAILY
COMMUNITY
Start: 2024-07-05 | End: 2024-07-12

## 2024-07-08 RX ORDER — SULFAMETHOXAZOLE AND TRIMETHOPRIM 800; 160 MG/1; MG/1
1 TABLET ORAL 2 TIMES DAILY
Qty: 56 TABLET | Refills: 0 | Status: SHIPPED | OUTPATIENT
Start: 2024-07-08 | End: 2024-08-05

## 2024-07-08 NOTE — TELEPHONE ENCOUNTER
[Last seen on 7/3/24]    Ceil/Wife (Last signed Verbal Release verified) and patient called states patient went to Emergency Department in [place], they did a CT and stated there was a thickening of his bladder and patient was diagnosed with UTI --> see Care Everywhere from 7/5/24 Emergency Department visit]. She states he has been dizzy as well when he bends down, then gets shortness of breath ... in Emergency Department notes it was noted HR was low, patient was made aware not to take metoprolol, when they got home patient was agitated and she gave Metoprolol. Denies any shortness of breath, chest pain, and/or chest tightness or palpitations. He did have a headache 2 days ago and given Tylenol. Last HR was 66 now. He is drinking about 120 oz a day, he will decrease to about 2 L with half-strength sugar-free Gatorade. Denies any blurred vision. He call to see his Cardiologist with update from recent Emergency Department visit and he would like to see a Urologist, contact # provided as he has [employer] PPO. Same day office visit requested --> scheduled. Patient instructed any new or worsening symptoms [reviewed] seek immediate medical attention--> Emergency Department. I made patient aware I will convey the above to Dr. Fonseca. Patient verbalized understanding. No further questions or concerns at this time.    Future Appointments   Date Time Provider Department Center   7/8/2024  4:00 PM Dieudonne Fonseca, DO [facility]     Dr. Fonseca - advise if any

## 2024-07-08 NOTE — PROGRESS NOTES
HPI:    Patient ID: Kike Epperson is a 53 year old male who presents for ED f/u.    HPI  Was experiencing dizziness with bending over or standing up too quickly on Thursday last week. Also noticed dark urine.   Woke up early Friday morning at 4am with darker urine and also had chills. No dysuria. No other urinary symptoms. No fevers. Felt nauseous.   Ended up going to ED for evaluation.   Had labs and CTAP scan, and was discharged home with ciprofloxacin due to concerns for UTI.     He has been taking ciprofloxacin as prescribed.  Urine is now normal in color. Chills have resolved.   No nausea.   No abdominal pain.     Still taking metoprolol and xarelto.   Drinks lots of water.     Past Medical History:    Atrial flutter with rapid ventricular response (HCC)    s/p cardioversion    GERD (gastroesophageal reflux disease)    Hyperlipidemia    Hypothyroidism    Mild coronary artery disease    CELESTE (obstructive sleep apnea)    Prediabetes    Tobacco use        Current Outpatient Medications   Medication Sig Dispense Refill    ciprofloxacin 500 MG Oral Tab Take 1 tablet (500 mg total) by mouth 2 (two) times daily.      sulfamethoxazole-trimethoprim -160 MG Oral Tab per tablet Take 1 tablet by mouth 2 (two) times daily for 28 days. 56 tablet 0    rivaroxaban 20 MG Oral Tab Take 1 tablet (20 mg total) by mouth.      metoprolol succinate ER 25 MG Oral Tablet 24 Hr Take 1 tablet (25 mg total) by mouth daily.      atorvastatin 20 MG Oral Tab Take 1 tablet (20 mg total) by mouth nightly. 90 tablet 3        No Active Allergies    Review of Systems   Constitutional: Negative.    Respiratory: Negative.  Negative for chest tightness and shortness of breath.    Cardiovascular: Negative.  Negative for chest pain and palpitations.   Gastrointestinal:         See HPI   Genitourinary:         See HPI   Neurological:  Positive for dizziness.   All other systems reviewed and are negative.           /70   Pulse 54   Ht 5' 7\"  (1.702 m)   Wt 211 lb (95.7 kg)   SpO2 98%   BMI 33.05 kg/m²     PHYSICAL EXAM:   Physical Exam  Constitutional:       General: He is not in acute distress.     Appearance: Normal appearance. He is not ill-appearing, toxic-appearing or diaphoretic.   HENT:      Head: Normocephalic and atraumatic.   Eyes:      Extraocular Movements: Extraocular movements intact.      Conjunctiva/sclera: Conjunctivae normal.   Cardiovascular:      Rate and Rhythm: Normal rate and regular rhythm.      Pulses: Normal pulses.      Heart sounds: Normal heart sounds. No murmur heard.     No friction rub. No gallop.   Pulmonary:      Effort: Pulmonary effort is normal. No respiratory distress.      Breath sounds: Normal breath sounds. No wheezing, rhonchi or rales.   Abdominal:      General: Abdomen is flat. Bowel sounds are normal. There is no distension.      Palpations: Abdomen is soft. There is no mass.      Tenderness: There is no abdominal tenderness. There is no right CVA tenderness, left CVA tenderness, guarding or rebound.      Hernia: No hernia is present.   Musculoskeletal:      Cervical back: Neck supple.      Right lower leg: No edema.      Left lower leg: No edema.   Skin:     General: Skin is warm and dry.      Capillary Refill: Capillary refill takes less than 2 seconds.   Neurological:      General: No focal deficit present.      Mental Status: He is alert.   Psychiatric:         Mood and Affect: Mood normal.         Behavior: Behavior normal.         Thought Content: Thought content normal.         Judgment: Judgment normal.             ASSESSMENT/PLAN:     Encounter Diagnoses   Name Primary?    Gross hematuria Yes    Current smoker     Atrial flutter with rapid ventricular response (HCC)     History of cardioversion        1. Gross hematuria  - Urology Referral - In Network    2. Current smoker  - Urology Referral - In Network    3. Atrial flutter with rapid ventricular response (HCC)    4. History of cardioversion      -Reviewed ED note & results.   -Suspect recent urinary & sick symptoms could be related to prostatitis. I recommend urology evaluation and info provided today. He will continue the 7-day course of ciprofloxacin, and start bactrim BID thereafter until he sees urology. Continue daily probiotic.   -Gross hematuria could be related to recently starting xarelto, however would greatly benefit from urology evaluation given smoking history. He will call urology tomorrow and update me once appointment is scheduled.   -Suspect dizziness is 2/2 metoprolol and orthostatic hypotension. Continue metoprolol for now, and advised slow positional changes & adequate water intake. Has cardiology f/u tomorrow.     Meds This Visit:  Requested Prescriptions     Signed Prescriptions Disp Refills    sulfamethoxazole-trimethoprim -160 MG Oral Tab per tablet 56 tablet 0     Sig: Take 1 tablet by mouth 2 (two) times daily for 28 days.       Imaging & Referrals:  UROLOGY - INTERNAL       Dieudonne Fonseca, DO  ID#[id number]

## 2024-07-12 ENCOUNTER — OFFICE VISIT (OUTPATIENT)
Dept: SURGERY | Facility: CLINIC | Age: 54
End: 2024-07-12

## 2024-07-12 ENCOUNTER — TELEPHONE (OUTPATIENT)
Facility: CLINIC | Age: 54
End: 2024-07-12

## 2024-07-12 VITALS — HEIGHT: 67 IN | WEIGHT: 211 LBS | BODY MASS INDEX: 33.12 KG/M2

## 2024-07-12 DIAGNOSIS — R31.0 GROSS HEMATURIA: Primary | ICD-10-CM

## 2024-07-12 PROCEDURE — 99244 OFF/OP CNSLTJ NEW/EST MOD 40: CPT | Performed by: UROLOGY

## 2024-07-12 PROCEDURE — 3008F BODY MASS INDEX DOCD: CPT | Performed by: UROLOGY

## 2024-07-12 NOTE — TELEPHONE ENCOUNTER
Patient is taking ABT for suspected UTI given by Dr. Fonseca  Patient did see urology today for consult  Per Urology note today:    \"As his urine culture returned negative he was told to consider stopping the Bactrim once he gets permission from his primary care provider (long as there is no other reason this was provided).\"    Please review full notes and confirm if patient is to continue Bactrim or stop the Bactrim.     Patient wife caller today, she is asking we call her back. She is going to MD appt herself and will not be able to  phone, she is asking that she be called back for this or send [employer] and they can obtain info that way.     Wife said ok to leave message on her cell with response.     Advised she is to call us back if no answer on this by 3PM today.

## 2024-07-12 NOTE — TELEPHONE ENCOUNTER
If urologist did not think he had prostatitis or a UTI based on culture results I agree with stopping antibiotic, but that is only based off of reading Dr. Fonseca's note since I did not see the patient.

## 2024-07-12 NOTE — TELEPHONE ENCOUNTER
Patient's wife/Ceil contacted and made aware of Dr. Ghosh's interpretation and recommendation. She will have patient stop the antibiotic. She verbalized understanding. No further questions or concerns at this time.

## 2024-07-12 NOTE — PROGRESS NOTES
Mirna Graham MD  Department of Urology  [address] [place] [address] Rd[address]  [place] [address]    T: [phone]  F: [phone]    To: Dieudonne Fonseca DO   [address] [address]    Re: Kike Epperson   MRN: [id number]  : 1970    Dear Dieudonne Fonseca DO,    Today I had the pleasure of seeing Kike Epperson in my clinic. As you know, Mr. Epperson is a pleasant 54 year old year old male who I am seeing for hematuria. Patient was last seen in this department on Visit date not found.    Briefly, saw on 2024 during which he noticed blood in his urine.  He had a urinalysis that demonstrated possible infection although urine culture was negative.  Please note that he did have a positive E. coli infection on 2024.  He was treated appropriately for this.    He has had a CT abdomen pelvis in 2024 with IV contrast that demonstrated no upper urinary tract abnormalities.  We do not have the images to look at ourselves.    He states that he did not drink as much water in the past however more recently as he saw blood in his urine he increased his hydration significantly.    He was given Bactrim by his primary care provider given possible urinary tract infection.       PAST MEDICAL HISTORY:  Past Medical History:    Atrial flutter with rapid ventricular response (HCC)    s/p cardioversion    GERD (gastroesophageal reflux disease)    Hyperlipidemia    Hypothyroidism    Mild coronary artery disease    CELESTE (obstructive sleep apnea)    Prediabetes    Tobacco use        PAST SURGICAL HISTORY:  No past surgical history on file.      ALLERGIES:  No Active Allergies      MEDICATIONS:  Current Outpatient Medications   Medication Instructions    atorvastatin (LIPITOR) 20 mg, Oral, Nightly    ciprofloxacin (CIPRO) 500 mg, Oral, 2 times daily    metoprolol succinate ER (TOPROL XL) 25 mg, Oral, Daily    rivaroxaban (XARELTO) 20 mg, Oral    sulfamethoxazole-trimethoprim -160 MG Oral Tab per tablet  1 tablet, Oral, 2 times daily        FAMILY HISTORY:  Family History   Problem Relation Age of Onset    Diabetes Mother     Other (Pulmonary Fibrosis) Mother         SOCIAL HISTORY:  Social History     Socioeconomic History    Marital status:    Tobacco Use    Smoking status: Every Day     Current packs/day: 1.00     Average packs/day: 1 pack/day for 25.0 years (25.0 ttl pk-yrs)     Types: Cigarettes    Smokeless tobacco: Never   Vaping Use    Vaping status: Never Used   Substance and Sexual Activity    Alcohol use: Yes     Comment: OCASSIONAL    Drug use: Yes     Types: Cannabis     Comment: OCC     Social Determinants of Health      [facility]    Social Connections    [facility], [facility]    Housing Stability          PHYSICAL EXAMINATION:  There were no vitals filed for this visit.  CONSTITUTIONAL: No apparent distress, cooperative and communicative  NEUROLOGIC: Alert and oriented   HEAD: Normocephalic, atraumatic   EYES: Sclera non-icteric   ENT: Hearing intact, moist mucous membranes   NECK: No obvious goiter or masses   RESPIRATORY: Normal respiratory effort, Nonlabored breathing on room air  SKIN: No evident rashes   ABDOMEN: Soft, nontender, nondistended, no rebound tenderness, no guarding, no masses      REVIEW OF SYSTEMS:    A comprehensive 10-point review of systems was completed.  Pertinent positives and negatives are noted in the the HPI.       LABORATORY DATA:  Glucose  70 - 99 mg/dL 124 High     Urea Nitrogen  6 - 20 mg/dL 18    Creatinine  0.7 - 1.2 mg/dL 0.9    EGFR CKD-EPI 2021  >=60 mL/min/1.73sqm 101 The 2021 CKD-EPI equation was used to estimate GFR from serum creatinine, age, and sex.   Calcium  8.6 - 10.0 mg/dL 9.3    Bilirubin Total  0.1 - 1.2 mg/dL 0.5    Protein  6.0 - 8.3 gm/dL 7.6    Albumin  3.5 - 5.2 gm/dL 4.2    Alkaline Phosphatase  40 - 129 U/L 94     ALT/SGPT  <=41 U/L 21    AST/SGOT  <=40 U/L 24    Sodium  136 - 145 mmol/L 140    Potassium  3.3 - 5.1 mmol/L 4.9    Chloride  98 - 107 mmol/L 104      Lipase  13 - 60 U/L 23     Component  Ref Range & Units 7 d ago   White Blood Count  3.6 - 12.9 X(10)3/uL 11.0   Red Blood Count  3.90 - 5.60 X(10)6/uL 5.01   Hemoglobin  12.8 - 16.9 gm/dL 15.3   Hematocrit  38.0 - 50.0 % 46.9   MCV  79.0 - 100.0 fl 93.6   MCH  26.0 - 34.0 pg 30.5   MCHC  31.9 - 35.8 gm/dL 32.6   RDW  12.0 - 15.2 % 12.3   Platelet Count  130 - 370 X(10)3/uL 351   MPV  8.9 - 12.6 fl 9.3   Neutrophil %, Auto  % 70.5   Lymphocyte %, Auto  % 19.3   Monocyte %, Auto  % 6.8   Eosinophil %, Auto  % 2.6   Basophil %, Auto  % 0.4     Component 7 d ago   Culture Result No Growth of >=1000 CFU/mL at 18-24 Hours   Resulting Agency [facility]     olor Urn Brown    Clarity Urn Cloudy    Glucose Urine  Negative mg/dL mg/dL mg/dL Negative    Bilirubin Urine  Negative Small Abnormal  Ictotest result to follow   Ketone Urine  Negative mg/dL Negative    Specific Gravity Urn  1.005 - 1.035 >=1.030    Blood Urn  Negative Large Abnormal     pH Urine  5.0 - 8.0 5.5    Protein Urine  Negative mg/dL 100 Abnormal     Urobilinogen Urn  0.2 - 1.0 EU/dL 0.2    Nitrite Urine  Negative Positive Abnormal     Leukocyte Urn  Negative Trace Abnormal     RBC Urine  0-2 , None /hpf >100 Abnormal     WBC Urine  0-5 , None /hpf 0-5    Epithelial Cell  /hpf Few    Bacteria  None /hpf Rare Abnormal     Culture Urine Culture to Follow              IMAGING REVIEW:  Impression      1. Mild circumferential urinary bladder wall thickening. Correlate with urinalysis for infectious/inflammatory cystitis.  2. No radiodense urinary calculi. No obstructive uropathy.  3. Ancillary findings as above.            Finalized by Moy Haines DO on 7/5/2024 8:44 CDT  Narrative    EXAM: CT ABDOMEN/PELVIS STONE PROTOCOL WITHOUT IV CONTRAST 7/5/2024 8:24 CDT    HISTORY: Flank  pain.    COMPARISON: None.    TECHNIQUE: Helical axial imaging through the abdomen and pelvis without intravenous or oral contrast per the renal stone protocol. Sagittal and coronal reformatted images were performed by the technologist. All CT scans at this facility use one or more  dose reduction techniques, e.g. automated exposure control; ma/kV adjustment per patient size or iterative reconstruction technique.    FINDINGS:    URINARY SYSTEM:    Kidneys and ureters: No radiodense calculi. No hydronephrosis or hydroureter.        Urinary bladder: Mild circumferential urinary bladder wall thickening. No radiodense calculi.    ABDOMEN:    Suboptimal evaluation of the vasculature and solid organs due to lack of intravenous contrast. Suboptimal evaluation of the bowel due to lack of oral contrast.    Lung bases: Unremarkable.    Liver: Unremarkable.    Biliary system: Unremarkable.    Spleen: Unremarkable.    Pancreas: Unremarkable.    Adrenal glands: Unremarkable.    Bowel: Small hiatal hernia. Bowel normal in caliber. Descending and rectosigmoid colon predominantly decompressed. Appendix unremarkable.    Peritoneum/retroperitoneum: No pathologically enlarged lymphadenopathy. No pneumoperitoneum. No ascites.    Vascular: Abdominal aorta normal in caliber. Minimal atherosclerotic calcifications.    Abdominal wall: Small fat-containing left inguinal hernia versus inguinal lipomatosis.    Osseous structures: No acute osseous findings. Mild degenerative spondylosis of the lower thoracic and lumbar spine.    PELVIS:    No pathologically enlarged lymphadenopathy. Mild prostatomegaly.  Procedure Note    Moy Haines DO - 07/05/2024  Formatting of this note might be different from the original.  EXAM: CT ABDOMEN/PELVIS STONE PROTOCOL WITHOUT IV CONTRAST 7/5/2024 8:24 CDT    HISTORY: Flank pain.    COMPARISON: None.    TECHNIQUE: Helical axial imaging through the abdomen and pelvis without intravenous or oral contrast per  the renal stone protocol. Sagittal and coronal reformatted images were performed by the technologist. All CT scans at this facility use one or more dose reduction techniques, e.g. automated exposure control; ma/kV adjustment per patient size or iterative reconstruction technique.    FINDINGS:    URINARY SYSTEM:    Kidneys and ureters: No radiodense calculi. No hydronephrosis or hydroureter.        Urinary bladder: Mild circumferential urinary bladder wall thickening. No radiodense calculi.    ABDOMEN:    Suboptimal evaluation of the vasculature and solid organs due to lack of intravenous contrast. Suboptimal evaluation of the bowel due to lack of oral contrast.    Lung bases: Unremarkable.    Liver: Unremarkable.    Biliary system: Unremarkable.    Spleen: Unremarkable.    Pancreas: Unremarkable.    Adrenal glands: Unremarkable.    Bowel: Small hiatal hernia. Bowel normal in caliber. Descending and rectosigmoid colon predominantly decompressed. Appendix unremarkable.    Peritoneum/retroperitoneum: No pathologically enlarged lymphadenopathy. No pneumoperitoneum. No ascites.    Vascular: Abdominal aorta normal in caliber. Minimal atherosclerotic calcifications.    Abdominal wall: Small fat-containing left inguinal hernia versus inguinal lipomatosis.    Osseous structures: No acute osseous findings. Mild degenerative spondylosis of the lower thoracic and lumbar spine.    PELVIS:    No pathologically enlarged lymphadenopathy. Mild prostatomegaly.    IMPRESSION:    1. Mild circumferential urinary bladder wall thickening. Correlate with urinalysis for infectious/inflammatory cystitis.  2. No radiodense urinary calculi. No obstructive uropathy.  3. Ancillary findings as above.     OTHER RELEVANT DATA:   none     IMPRESSION: Gross hematuria in the absence of true infection with negative CT abdomen pelvis-recommended cystoscopy and cytology.  Mentioned that despite his urine culture being negative his urinalysis was quite  dirty and he appeared to be dehydrated with his high specific gravity which could have explain the darker urine that he saw, however when patients state that they have blood in the urine we typically proceed with the full workup.    I discussed hematuria in detail both gross and microscopic.  I mentioned that it can come from anywhere the urine touches in the urinary tract including the kidneys, ureters, urethra, prostate in men, vagina/uterus in women.  I mention that there are benign causes like kidney stones, trauma, heavy exercise, idiopathic hematuria as well as more concerning causes like a kidney tumor, ureteral tumor or bladder tumor.     I mentioned that if his workup is negative he needs to work on UTI prevention.  If he is having lower urinary tract symptoms, we generally would start him on a medication called tamsulosin 0.4 mg nightly which would help with any urinary tract symptoms.  He was told to increase hydration, practice timed voiding and avoid constipation and bladder irritants for the time being.    As his urine culture returned negative he was told to consider stopping the Bactrim once he gets permission from his primary care provider (long as there is no other reason this was provided).       PLAN:  Cystoscopy  Cytology  No need to repeat CT scan as report suggests no upper tract abnormalities noncontrasted CT   If workup is negative-would consider UTI prevention behaviors and initiation of tamsulosin 0.4 mg nightly    Thank you for referring this very pleasant patient to my clinic. If you have any questions or concerns, please do not hesitate to contact me.    Sincerely,  Mirna Graham MD    30 minutes were spent on this patient at this visit obtaining a history, reviewing medical records, developing a treatment plan, counseling and discussing treatment strategy with patient, coordination of care and documentation.     The 21st Century Cures Act makes medical notes available to patients  in the interest of transparency.  However, please be advised that this is a medical document.  It is intended as a peer to peer communication.  It is written in medical language and may contain abbreviations or verbiage that are technical and unfamiliar.  It may appear blunt or direct.  Medical documents are intended to carry relevant information, facts as evident, and the clinical opinion of the practitioner.

## 2024-07-23 ENCOUNTER — PROCEDURE (OUTPATIENT)
Dept: SURGERY | Facility: CLINIC | Age: 54
End: 2024-07-23
Payer: COMMERCIAL

## 2024-07-23 ENCOUNTER — TELEPHONE (OUTPATIENT)
Dept: SURGERY | Facility: CLINIC | Age: 54
End: 2024-07-23

## 2024-07-23 VITALS
DIASTOLIC BLOOD PRESSURE: 82 MMHG | BODY MASS INDEX: 33.12 KG/M2 | SYSTOLIC BLOOD PRESSURE: 119 MMHG | WEIGHT: 211 LBS | HEIGHT: 67 IN | HEART RATE: 71 BPM

## 2024-07-23 DIAGNOSIS — R31.0 GROSS HEMATURIA: Primary | ICD-10-CM

## 2024-07-23 PROCEDURE — 3079F DIAST BP 80-89 MM HG: CPT | Performed by: UROLOGY

## 2024-07-23 PROCEDURE — 3008F BODY MASS INDEX DOCD: CPT | Performed by: UROLOGY

## 2024-07-23 PROCEDURE — 52000 CYSTOURETHROSCOPY: CPT | Performed by: UROLOGY

## 2024-07-23 PROCEDURE — 3074F SYST BP LT 130 MM HG: CPT | Performed by: UROLOGY

## 2024-07-23 NOTE — TELEPHONE ENCOUNTER
Hi!  This patient needs a transurethral resection of bladder lesion. Should be booked for 1 hours in the next 1-4 weeks. Needs labs as ordered below. Needs cardiac clearance, needs to hold rivaroxaban x 2-3 days    Thanks!  AR    Urology Surgery Scheduling Request    Location: [facility] OR    Surgeon: SAE Graham MD    Asst. Surgeon: N/A    Diagnosis: Bladder tumor    Procedure: Cystoscopy transurethral resection of bladder lesion    Anesthesia: General     Time Frame: 1-4 weeks    Time needed: 1 hour    Special Equipment: Resectoscope    On Call to OR: Anc     Admission: Day Surgery    Pre-op Testing: CBC, CMP, PT/INR and Urine Culture     Need Pre-op Clearance: cardiac    Estimated Post Op/Follow Up Appt: tbd.    Mirna Graham MD

## 2024-07-23 NOTE — PROCEDURES
CYSTOSCOPY (MALE)    PRE-OP DIAGNOSIS: gross hematuria    POST-OP DIAGNOSIS: same    PROCEDURE: Cystsocopy    SURGEON: Mirna Graham MD    ASSISTANT: none     EBL: minimal    FINDINGS:   Urethra: No meatal stenosis, no anterior or posterior urethral strictures, open bladder neck   Prostate: Bilobar coaptation with mild intravesical component   Bladder: Bilateral ureteral orifices in orthotopic position with efflux, no suspicious or concerning erythematous lesions, no papillary bladder tumors, no stones   Retroflexion: Mild intravesical component of prostate   Other findings: none    INDICATIONS: gross hematuria. Cytology negative. CT I- negative.     PROCEDURE: Patient was brought to the procedure suite and a timeout was performed identifying the patient,  and procedure being performed.  The risks of the procedure were once again detailed to the patient including bleeding, infection, dysuria.  The patient agreed to proceed.  The patient had a negative urinalysis.  No antibiotics were given to patient prior to this procedure.     He was placed in a supine position on the table and a flexible cystoscope was inserted per urethra.  There were no obvious urethral strictures in the anterior, membranous or posterior urethra.  The prostate appeared slightly enlarged.  Once in the bladder we performed a full diagnostic/surveillance cystoscopy which demonstrated no abnormalities.  On retroflexion we noted no abnormalities.  The scope was then carefully removed and once again no urethral abnormalities were noted.    There were no complications after this procedure and the patient tolerated the procedure without issue.    IMPRESSION: cysto with small prostate nodule versus bladder lesion of unclear significance - will plan for OR: TURBT.    I discussed that transurethral resection of a bladder tumor is performed in the operating room.  We would use electrocautery to resect the abnormal mass.  He may have a catheter in  place after the surgery for about 5-7 days.  The catheter would be removed in the clinic after that point. Risks of the procedure including bleeding, severe bleeding causing clot retention and requiring take back to the operating room, infection, damage to surrounding structures (urethra, prostate, bladder, ureteral orifices), possible need for stent if the ureteral orifice were to be resected, need for additional procedures, anesthesia complications (cardio pulmonary complications), sepsis, inability to urinate/persistent urinary retention.        PLAN:    OR: TURBT  Cbc, chem7, inr, urine culture  Hold blood thinners 2-3 days prior  Cardiac clearance

## 2024-07-24 NOTE — TELEPHONE ENCOUNTER
Spoke with patient to schedule surgery, patient would like to have clearance in place before scheduling.    Once clearance is received patient will call to schedule surgery.    Please transfer to [phone].    Clearance faxed to  I will await recommendations.

## 2024-08-14 ENCOUNTER — PATIENT MESSAGE (OUTPATIENT)
Facility: CLINIC | Age: 54
End: 2024-08-14

## 2024-08-14 NOTE — TELEPHONE ENCOUNTER
From: Kike Epperson  To: Dieudonne Fonseca  Sent: 8/14/2024 10:56 AM CDT  Subject: Blood Pressure    Hi Doc,  I typically do not have high blood pressure.  Since my UTI and hospitalization incidents back at the end of June   I check BP daily and it is high everyday.  Please contact me in reference.  I am scheduled for the Ablation this coming Friday.    Kindest regards,    Kike    [phone]

## 2024-08-19 ENCOUNTER — TELEPHONE (OUTPATIENT)
Facility: CLINIC | Age: 54
End: 2024-08-19

## 2024-08-19 NOTE — TELEPHONE ENCOUNTER
Dr Fonseca, please advise    Do you want patient to set up a follow up appt with you? Okay to use SDA this week?       Patient's wife, Ceil calling (name and , TEE verified) with blood pressure concerns. Patient just had an ablation on  at [facility]. Patient is not currently taking blood pressure medication. Reported b/p yesterday was 160/105 and today 133/85. Denies symptoms. Advised to continue with blood pressure log. Advised to reach out to cardiologist for further recommendations and follow up because he just had the cardiac procedure. Message sent to PCP for further recommendations and appt request.  Patient verbalized understanding.

## 2024-08-19 NOTE — TELEPHONE ENCOUNTER
I agree with plan to contact cardiology team, as they would primarily manage the BP at this time. Thank you.

## 2024-08-19 NOTE — TELEPHONE ENCOUNTER
Patient wife was called and inform of Dr. Fonseca message below and she verbalized understanding.

## 2025-01-28 ENCOUNTER — TELEPHONE (OUTPATIENT)
Facility: CLINIC | Age: 55
End: 2025-01-28

## 2025-01-28 DIAGNOSIS — Z00.00 PHYSICAL EXAM, ANNUAL: Primary | ICD-10-CM

## 2025-01-28 NOTE — TELEPHONE ENCOUNTER
Patient spouse requesting lab orders be placed for patient to complete prior to annual exam. Please assist.

## 2025-06-04 ENCOUNTER — LAB ENCOUNTER (OUTPATIENT)
Dept: LAB | Facility: REFERENCE LAB | Age: 55
End: 2025-06-04
Attending: FAMILY MEDICINE
Payer: COMMERCIAL

## 2025-06-04 LAB
ALBUMIN SERPL-MCNC: 4.5 G/DL (ref 3.2–4.8)
ALBUMIN/GLOB SERPL: 1.8 {RATIO} (ref 1–2)
ALP LIVER SERPL-CCNC: 72 U/L (ref 45–117)
ALT SERPL-CCNC: 19 U/L (ref 10–49)
ANION GAP SERPL CALC-SCNC: 6 MMOL/L (ref 0–18)
AST SERPL-CCNC: 26 U/L (ref ?–34)
BASOPHILS # BLD AUTO: 0.07 X10(3) UL (ref 0–0.2)
BASOPHILS NFR BLD AUTO: 0.7 %
BILIRUB SERPL-MCNC: 0.6 MG/DL (ref 0.3–1.2)
BUN BLD-MCNC: 11 MG/DL (ref 9–23)
BUN/CREAT SERPL: 12.5 (ref 10–20)
CALCIUM BLD-MCNC: 8.9 MG/DL (ref 8.7–10.4)
CHLORIDE SERPL-SCNC: 105 MMOL/L (ref 98–112)
CHOLEST SERPL-MCNC: 168 MG/DL (ref ?–200)
CO2 SERPL-SCNC: 27 MMOL/L (ref 21–32)
CREAT BLD-MCNC: 0.88 MG/DL (ref 0.7–1.3)
DEPRECATED RDW RBC AUTO: 43.1 FL (ref 35.1–46.3)
EGFRCR SERPLBLD CKD-EPI 2021: 101 ML/MIN/1.73M2 (ref 60–?)
EOSINOPHIL # BLD AUTO: 0.27 X10(3) UL (ref 0–0.7)
EOSINOPHIL NFR BLD AUTO: 2.9 %
ERYTHROCYTE [DISTWIDTH] IN BLOOD BY AUTOMATED COUNT: 13.2 % (ref 11–15)
EST. AVERAGE GLUCOSE BLD GHB EST-MCNC: 123 MG/DL (ref 68–126)
FASTING PATIENT LIPID ANSWER: YES
FASTING STATUS PATIENT QL REPORTED: YES
GLOBULIN PLAS-MCNC: 2.5 G/DL (ref 2–3.5)
GLUCOSE BLD-MCNC: 85 MG/DL (ref 70–99)
HBA1C MFR BLD: 5.9 % (ref ?–5.7)
HCT VFR BLD AUTO: 47.2 % (ref 39–53)
HDLC SERPL-MCNC: 54 MG/DL (ref 40–59)
HGB BLD-MCNC: 15.8 G/DL (ref 13–17.5)
IMM GRANULOCYTES # BLD AUTO: 0.02 X10(3) UL (ref 0–1)
IMM GRANULOCYTES NFR BLD: 0.2 %
LDLC SERPL CALC-MCNC: 96 MG/DL (ref ?–100)
LYMPHOCYTES # BLD AUTO: 3.48 X10(3) UL (ref 1–4)
LYMPHOCYTES NFR BLD AUTO: 37.1 %
MCH RBC QN AUTO: 30.1 PG (ref 26–34)
MCHC RBC AUTO-ENTMCNC: 33.5 G/DL (ref 31–37)
MCV RBC AUTO: 89.9 FL (ref 80–100)
MONOCYTES # BLD AUTO: 0.76 X10(3) UL (ref 0.1–1)
MONOCYTES NFR BLD AUTO: 8.1 %
NEUTROPHILS # BLD AUTO: 4.77 X10 (3) UL (ref 1.5–7.7)
NEUTROPHILS # BLD AUTO: 4.77 X10(3) UL (ref 1.5–7.7)
NEUTROPHILS NFR BLD AUTO: 51 %
NONHDLC SERPL-MCNC: 114 MG/DL (ref ?–130)
OSMOLALITY SERPL CALC.SUM OF ELEC: 285 MOSM/KG (ref 275–295)
PLATELET # BLD AUTO: 256 10(3)UL (ref 150–450)
POTASSIUM SERPL-SCNC: 4 MMOL/L (ref 3.5–5.1)
PROT SERPL-MCNC: 7 G/DL (ref 5.7–8.2)
PSA SERPL-MCNC: 0.93 NG/ML (ref ?–4)
RBC # BLD AUTO: 5.25 X10(6)UL (ref 4.3–5.7)
SODIUM SERPL-SCNC: 138 MMOL/L (ref 136–145)
TRIGL SERPL-MCNC: 100 MG/DL (ref 30–149)
TSI SER-ACNC: 4.61 UIU/ML (ref 0.55–4.78)
VLDLC SERPL CALC-MCNC: 16 MG/DL (ref 0–30)
WBC # BLD AUTO: 9.4 X10(3) UL (ref 4–11)

## 2025-06-04 PROCEDURE — 80050 GENERAL HEALTH PANEL: CPT | Performed by: FAMILY MEDICINE

## 2025-06-04 PROCEDURE — 84153 ASSAY OF PSA TOTAL: CPT | Performed by: FAMILY MEDICINE

## 2025-06-04 PROCEDURE — 83036 HEMOGLOBIN GLYCOSYLATED A1C: CPT | Performed by: FAMILY MEDICINE

## 2025-06-04 PROCEDURE — 80061 LIPID PANEL: CPT | Performed by: FAMILY MEDICINE

## 2025-06-05 ENCOUNTER — OFFICE VISIT (OUTPATIENT)
Facility: CLINIC | Age: 55
End: 2025-06-05
Payer: COMMERCIAL

## 2025-06-05 VITALS
SYSTOLIC BLOOD PRESSURE: 132 MMHG | DIASTOLIC BLOOD PRESSURE: 80 MMHG | HEART RATE: 67 BPM | BODY MASS INDEX: 32.96 KG/M2 | WEIGHT: 210 LBS | HEIGHT: 67 IN | OXYGEN SATURATION: 98 %

## 2025-06-05 DIAGNOSIS — R73.03 PREDIABETES: ICD-10-CM

## 2025-06-05 DIAGNOSIS — E78.5 DYSLIPIDEMIA: ICD-10-CM

## 2025-06-05 DIAGNOSIS — Z92.89 HISTORY OF CARDIOVERSION: ICD-10-CM

## 2025-06-05 DIAGNOSIS — I25.10 MILD CORONARY ARTERY DISEASE: ICD-10-CM

## 2025-06-05 DIAGNOSIS — Z00.00 PHYSICAL EXAM, ANNUAL: Primary | ICD-10-CM

## 2025-06-05 DIAGNOSIS — E06.3 HYPOTHYROIDISM DUE TO HASHIMOTO'S THYROIDITIS: ICD-10-CM

## 2025-06-05 DIAGNOSIS — I48.92 ATRIAL FLUTTER WITH RAPID VENTRICULAR RESPONSE (HCC): ICD-10-CM

## 2025-06-05 DIAGNOSIS — F17.200 CURRENT SMOKER: ICD-10-CM

## 2025-06-05 NOTE — PROGRESS NOTES
Kike Epperson is a 54 year old male who presents for a complete physical exam.   HPI:     Still smoking daily.     Concerns: None. Feels well.   Last colonoscopy:  09/2018 showing internal hemorrhoids. Rec'd repeat in 10 years.  Last PSA: 0.93 yesterday  Last LDCT: 06/2024 and negative  Diet/exercise: No regular exercise. Diet is okay.   Hx of STI screening: No hx or concerns  Substance abuse: None  Vaccines- Tdap: 12/2019 , Shingles: Never & declines , Pneumococcal: Never & declines    REVIEW OF SYSTEMS:   GENERAL: feels well otherwise   SKIN: denies any unusual skin lesions  EYES:denies vision change  HEENT: no hearing changes, negative  LUNGS: denies shortness of breath with exertion  CARDIOVASCULAR: denies chest pain on exertion  GI: denies abdominal pain, constipation or diarrhea. No hematochezia or melena  : denies nocturia or changes in stream  NEURO: denies headaches  PSYCHE: denies depression or anxiety    Current Medications[1]   Past Medical History[2]   Past Surgical History[3]   Family History[4]   Social History:  Short Social Hx on File[5]        EXAM:     Wt Readings from Last 6 Encounters:   06/05/25 210 lb (95.3 kg)   07/23/24 211 lb (95.7 kg)   07/12/24 211 lb (95.7 kg)   07/08/24 211 lb (95.7 kg)   07/03/24 211 lb (95.7 kg)   06/25/24 212 lb (96.2 kg)     Body mass index is 32.89 kg/m².    /80   Pulse 67   Ht 5' 7\" (1.702 m)   Wt 210 lb (95.3 kg)   SpO2 98%   BMI 32.89 kg/m²      GENERAL: well developed, well nourished, in no apparent distress  SKIN: no rashes, no suspicious lesions  HEENT: atraumatic, normocephalic, MMM, nose normal, Tms & EACs normal  EYES: PERRLA, EOMI, no conjunctival injection  NECK: supple, no adenopathy   LUNGS: CTA b/l, no w/r/r  CARDIO: RRR without murmur  GI: normoactive bowel sounds, NT/ND, no masses, no HSM  EXTREMITIES: no cyanosis, clubbing or edema  NEURO: Alert & oriented, motor and sensory are grossly intact    Cholesterol, Total (mg/dL)   Date Value    06/04/2025 168   06/03/2024 151   07/21/2023 176     HDL Cholesterol (mg/dL)   Date Value   06/04/2025 54   06/03/2024 46   07/21/2023 61 (H)     LDL Cholesterol (mg/dL)   Date Value   06/04/2025 96   06/03/2024 87   07/21/2023 97     AST (U/L)   Date Value   06/04/2025 26   06/03/2024 31   07/21/2023 21     ALT (U/L)   Date Value   06/04/2025 19   06/03/2024 23   07/21/2023 28      ASSESSMENT AND PLAN:   Kike Epperson is a 54 year old male who presents for a complete physical exam.    Encounter Diagnoses   Name Primary?    Physical exam, annual Yes    Atrial flutter with rapid ventricular response (HCC)     History of cardioversion     Mild coronary artery disease     Dyslipidemia     Prediabetes     Hypothyroidism due to Hashimoto's thyroiditis     Current smoker      No orders of the defined types were placed in this encounter.      -Primary health goals are to decrease/quit smoking and increase exercise.  -Reviewed recent labs. A1c stable at 5.9%. Labs otherwise normal.   -Due for LDCT Lung.     Discussed with patient the following:  -Risks and benefits of screening for prostate cancer:  -Colon cancer screening   -Healthy diet including adequate intake of vegetables and fruits, appropriate portion sizes, minimizing highly concentrated carbohydrate foods  -Exercising 30 minutes a day most days of the week   -Importance of regular exercise and weight maintenance/loss   -Smoking cessation   -Diabetes screening   -Cholesterol screening   -Recommendation for yearly influenza vaccine  -Need for Tdap once as an adult and Td booster every 10 years      Meds & Refills for this Visit:  Requested Prescriptions      No prescriptions requested or ordered in this encounter       Imaging & Consults:  CT LUNG LD SCREENING(CPT=71271)    Return in 1 year for annual physical or sooner as needed.     Dieudonne Fonseca DO  06/05/25   10:22 AM         [1]   Current Outpatient Medications   Medication Sig Dispense Refill     atorvastatin 20 MG Oral Tab Take 1 tablet (20 mg total) by mouth nightly. 90 tablet 3   [2]   Past Medical History:   Atrial flutter with rapid ventricular response (HCC)    s/p cardioversion    GERD (gastroesophageal reflux disease)    Hyperlipidemia    Hypothyroidism    Mild coronary artery disease    CELESTE (obstructive sleep apnea)    Prediabetes    Tobacco use   [3] No past surgical history on file.  [4]   Family History  Problem Relation Age of Onset    Diabetes Mother     Other (Pulmonary Fibrosis) Mother    [5]   Social History  Socioeconomic History    Marital status:    Tobacco Use    Smoking status: Every Day     Current packs/day: 1.00     Average packs/day: 1 pack/day for 25.0 years (25.0 ttl pk-yrs)     Types: Cigarettes    Smokeless tobacco: Never   Vaping Use    Vaping status: Never Used   Substance and Sexual Activity    Alcohol use: Yes     Comment: OCASSIONAL    Drug use: Yes     Types: Cannabis     Comment: OCC     Social Drivers of Health     Transportation Needs: No Transportation Needs (9/3/2024)    Received from [facility]    Transportation Needs     Currently or in the past 3 months, has lack of transportation kept you from medical appointments, getting food or medicine, or providing care to a family member?: No     Medical Transportation Needs?: No    Received from [facility]    Housing Stability

## (undated) DEVICE — CONMED SCOPE SAVER BITE BLOCK, 20X27 MM: Brand: SCOPE SAVER

## (undated) DEVICE — Device: Brand: DEFENDO AIR/WATER/SUCTION AND BIOPSY VALVE

## (undated) DEVICE — LINE MNTR ADLT SET O2 INTMD

## (undated) DEVICE — FORCEP RADIAL JAW 4

## (undated) DEVICE — MEDI-VAC NON-CONDUCTIVE SUCTION TUBING 6MM X 1.8M (6FT.) L: Brand: CARDINAL HEALTH

## (undated) DEVICE — ALL PURPOSE SPONGES,NONWOVEN, 4 PLY: Brand: CURITY

## (undated) DEVICE — Device: Brand: CUSTOM PROCEDURE KIT

## (undated) DEVICE — 35 ML SYRINGE REGULAR TIP: Brand: MONOJECT

## (undated) NOTE — LETTER
Patient Name: Ashley Schroeder        : 1970       Medical Record #: [id number]    CONSENT FOR PROCEDURES/SEDATION    Date: 2022       Time: 2:19 PM        1.  I authorize the performance upon Ashley Schroeder the following:    Right Kenalog Inject